# Patient Record
Sex: FEMALE | Race: WHITE | Employment: FULL TIME | URBAN - METROPOLITAN AREA
[De-identification: names, ages, dates, MRNs, and addresses within clinical notes are randomized per-mention and may not be internally consistent; named-entity substitution may affect disease eponyms.]

---

## 2017-10-21 ENCOUNTER — HOSPITAL ENCOUNTER (OUTPATIENT)
Age: 57
Discharge: HOME | End: 2017-10-21
Payer: COMMERCIAL

## 2017-10-21 VITALS
HEART RATE: 78 BPM | SYSTOLIC BLOOD PRESSURE: 120 MMHG | RESPIRATION RATE: 18 BRPM | WEIGHT: 173 LBS | TEMPERATURE: 97 F | OXYGEN SATURATION: 98 % | BODY MASS INDEX: 27.8 KG/M2 | DIASTOLIC BLOOD PRESSURE: 68 MMHG | HEIGHT: 66 IN

## 2017-10-21 DIAGNOSIS — S05.02XA ABRASION OF LEFT CORNEA, INITIAL ENCOUNTER: Primary | ICD-10-CM

## 2017-10-21 PROCEDURE — 99203 OFFICE O/P NEW LOW 30 MIN: CPT

## 2017-10-21 RX ORDER — TOBRAMYCIN AND DEXAMETHASONE 3; 1 MG/ML; MG/ML
2 SUSPENSION/ DROPS OPHTHALMIC
Qty: 1 BOTTLE | Refills: 0 | Status: SHIPPED | OUTPATIENT
Start: 2017-10-21 | End: 2017-10-28

## 2017-10-21 NOTE — ED INITIAL ASSESSMENT (HPI)
Left  Eye  Redness and irritation since last  Night. Pt  Reports fb sensation. Pt states last night eye was tearing, irritating, and painful. Pt removed contacts last night.

## 2017-10-21 NOTE — ED PROVIDER NOTES
Patient Seen in: 5 [facility]    History   Patient presents with:   Eye Visual Problem (opthalmic)    Stated Complaint: lt. eye redness    HPI     Patient is an otherwise healthy 15-year-old female who presents for evaluatio oz/week     Comment: moderatly       Review of Systems   Constitutional: Negative. HENT: Negative. Eyes: Positive for photophobia, discharge and redness. Negative for visual disturbance. Respiratory: Negative. Cardiovascular: Negative.         Po left cornea, initial encounter  (primary encounter diagnosis)    Disposition:  Discharge    Follow-up:  OPTHALMOLOGIST      1-2 DAYS       Medications Prescribed:  Current Discharge Medication List    START taking these medications    tobramycin-dexamethas

## 2018-03-12 ENCOUNTER — CHARTING TRANS (OUTPATIENT)
Dept: OTHER | Age: 58
End: 2018-03-12

## 2018-03-12 ENCOUNTER — LAB SERVICES (OUTPATIENT)
Dept: OTHER | Age: 58
End: 2018-03-12

## 2018-03-12 LAB — RAPID STREP GROUP A: NORMAL

## 2018-11-01 VITALS
WEIGHT: 178.57 LBS | BODY MASS INDEX: 28.7 KG/M2 | DIASTOLIC BLOOD PRESSURE: 70 MMHG | HEIGHT: 66 IN | TEMPERATURE: 99.4 F | SYSTOLIC BLOOD PRESSURE: 104 MMHG | HEART RATE: 82 BPM | OXYGEN SATURATION: 95 %

## 2021-08-17 ENCOUNTER — APPOINTMENT (OUTPATIENT)
Dept: CT IMAGING | Facility: HOSPITAL | Age: 61
End: 2021-08-17
Attending: EMERGENCY MEDICINE
Payer: COMMERCIAL

## 2021-08-17 ENCOUNTER — HOSPITAL ENCOUNTER (EMERGENCY)
Facility: HOSPITAL | Age: 61
Discharge: HOME | End: 2021-08-17
Attending: EMERGENCY MEDICINE
Payer: COMMERCIAL

## 2021-08-17 VITALS
DIASTOLIC BLOOD PRESSURE: 68 MMHG | HEART RATE: 75 BPM | OXYGEN SATURATION: 95 % | BODY MASS INDEX: 26.52 KG/M2 | TEMPERATURE: 99 F | WEIGHT: 165 LBS | SYSTOLIC BLOOD PRESSURE: 106 MMHG | HEIGHT: 66 IN | RESPIRATION RATE: 18 BRPM

## 2021-08-17 DIAGNOSIS — N12 PYELONEPHRITIS: Primary | ICD-10-CM

## 2021-08-17 LAB
ANION GAP SERPL CALC-SCNC: 6 MMOL/L (ref 0–18)
BASOPHILS # BLD AUTO: 0.02 X10(3) UL (ref 0–0.2)
BASOPHILS NFR BLD AUTO: 0.5 %
BILIRUB UR QL: NEGATIVE
BUN BLD-MCNC: 14 MG/DL (ref 7–18)
BUN/CREAT SERPL: 16.7 (ref 10–20)
CALCIUM BLD-MCNC: 8.3 MG/DL (ref 8.5–10.1)
CHLORIDE SERPL-SCNC: 106 MMOL/L (ref 98–112)
CO2 SERPL-SCNC: 25 MMOL/L (ref 21–32)
COLOR UR: YELLOW
CREAT BLD-MCNC: 0.84 MG/DL
DEPRECATED RDW RBC AUTO: 41.2 FL (ref 35.1–46.3)
EOSINOPHIL # BLD AUTO: 0.01 X10(3) UL (ref 0–0.7)
EOSINOPHIL NFR BLD AUTO: 0.2 %
ERYTHROCYTE [DISTWIDTH] IN BLOOD BY AUTOMATED COUNT: 12.3 % (ref 11–15)
GLUCOSE BLD-MCNC: 110 MG/DL (ref 70–99)
GLUCOSE UR-MCNC: NEGATIVE MG/DL
HCT VFR BLD AUTO: 38.7 %
HGB BLD-MCNC: 12.7 G/DL
HGB UR QL STRIP.AUTO: NEGATIVE
IMM GRANULOCYTES # BLD AUTO: 0.03 X10(3) UL (ref 0–1)
IMM GRANULOCYTES NFR BLD: 0.7 %
KETONES UR-MCNC: 20 MG/DL
LYMPHOCYTES # BLD AUTO: 0.71 X10(3) UL (ref 1–4)
LYMPHOCYTES NFR BLD AUTO: 17 %
MCH RBC QN AUTO: 29.7 PG (ref 26–34)
MCHC RBC AUTO-ENTMCNC: 32.8 G/DL (ref 31–37)
MCV RBC AUTO: 90.6 FL
MONOCYTES # BLD AUTO: 0.34 X10(3) UL (ref 0.1–1)
MONOCYTES NFR BLD AUTO: 8.1 %
NEUTROPHILS # BLD AUTO: 3.07 X10 (3) UL (ref 1.5–7.7)
NEUTROPHILS # BLD AUTO: 3.07 X10(3) UL (ref 1.5–7.7)
NEUTROPHILS NFR BLD AUTO: 73.5 %
NITRITE UR QL STRIP.AUTO: NEGATIVE
OSMOLALITY SERPL CALC.SUM OF ELEC: 285 MOSM/KG (ref 275–295)
PH UR: 5 [PH] (ref 5–8)
PLATELET # BLD AUTO: 150 10(3)UL (ref 150–450)
POTASSIUM SERPL-SCNC: 3.6 MMOL/L (ref 3.5–5.1)
PROT UR-MCNC: 30 MG/DL
RBC # BLD AUTO: 4.27 X10(6)UL
SODIUM SERPL-SCNC: 137 MMOL/L (ref 136–145)
SP GR UR STRIP: 1.02 (ref 1–1.03)
UROBILINOGEN UR STRIP-ACNC: <2
WBC # BLD AUTO: 4.2 X10(3) UL (ref 4–11)

## 2021-08-17 PROCEDURE — 81001 URINALYSIS AUTO W/SCOPE: CPT | Performed by: EMERGENCY MEDICINE

## 2021-08-17 PROCEDURE — 99284 EMERGENCY DEPT VISIT MOD MDM: CPT

## 2021-08-17 PROCEDURE — 96361 HYDRATE IV INFUSION ADD-ON: CPT

## 2021-08-17 PROCEDURE — 96365 THER/PROPH/DIAG IV INF INIT: CPT

## 2021-08-17 PROCEDURE — 80048 BASIC METABOLIC PNL TOTAL CA: CPT | Performed by: EMERGENCY MEDICINE

## 2021-08-17 PROCEDURE — 96366 THER/PROPH/DIAG IV INF ADDON: CPT

## 2021-08-17 PROCEDURE — 74176 CT ABD & PELVIS W/O CONTRAST: CPT | Performed by: EMERGENCY MEDICINE

## 2021-08-17 PROCEDURE — 85025 COMPLETE CBC W/AUTO DIFF WBC: CPT | Performed by: EMERGENCY MEDICINE

## 2021-08-17 PROCEDURE — 96375 TX/PRO/DX INJ NEW DRUG ADDON: CPT

## 2021-08-17 PROCEDURE — 87086 URINE CULTURE/COLONY COUNT: CPT | Performed by: EMERGENCY MEDICINE

## 2021-08-17 RX ORDER — TRAMADOL HYDROCHLORIDE 50 MG/1
50 TABLET ORAL EVERY 6 HOURS PRN
Qty: 10 TABLET | Refills: 0 | Status: SHIPPED | OUTPATIENT
Start: 2021-08-17 | End: 2021-08-24

## 2021-08-17 RX ORDER — MORPHINE SULFATE 4 MG/ML
2 INJECTION, SOLUTION INTRAMUSCULAR; INTRAVENOUS ONCE
Status: COMPLETED | OUTPATIENT
Start: 2021-08-17 | End: 2021-08-17

## 2021-08-17 RX ORDER — KETOROLAC TROMETHAMINE 15 MG/ML
15 INJECTION, SOLUTION INTRAMUSCULAR; INTRAVENOUS ONCE
Status: COMPLETED | OUTPATIENT
Start: 2021-08-17 | End: 2021-08-17

## 2021-08-17 RX ORDER — CEFADROXIL 500 MG/1
1 CAPSULE ORAL 2 TIMES DAILY
Qty: 40 CAPSULE | Refills: 0 | Status: SHIPPED | OUTPATIENT
Start: 2021-08-17 | End: 2021-08-27

## 2021-08-17 NOTE — ED INITIAL ASSESSMENT (HPI)
Patient complains of having left flank pain radiating to the groin. States that the pain started on Sunday. Patient reports a low grade temp of 100.8 two days ago. No N/V/D. Patient reports a history of kidney stones.  No s/s of UTI stated, no painful urina

## 2021-08-17 NOTE — ED PROVIDER NOTES
Patient Seen in: [facility] AND [facility] Emergency Department      History   Patient presents with:  Abdomen/Flank Pain    Stated Complaint: flank pain hx kidney stones    HPI/Subjective:   HPI    Patient presents the emergency department complaining of left f systems reviewed and negative except as noted above.     Physical Exam     ED Triage Vitals [08/17/21 0804]   /77   Pulse 96   Resp 18   Temp 98.8 °F (37.1 °C)   Temp src Oral   SpO2 95 %   O2 Device None (Room air)       Current:/68   Pulse 75 components:    Lymphocyte Absolute 0.71 (*)     All other components within normal limits   CBC WITH DIFFERENTIAL WITH PLATELET    Narrative: The following orders were created for panel order CBC With Differential With Platelet.   Procedure Prescribed:  Current Discharge Medication List    START taking these medications    Cefadroxil 500 MG Oral Cap  Take 2 capsules (1,000 mg total) by mouth 2 (two) times daily for 10 days.   Qty: 40 capsule Refills: 0    traMADol 50 MG Oral Tab  Take 1 tablet

## 2021-08-19 ENCOUNTER — HOSPITAL ENCOUNTER (EMERGENCY)
Facility: HOSPITAL | Age: 61
Discharge: HOME | End: 2021-08-19
Attending: EMERGENCY MEDICINE
Payer: COMMERCIAL

## 2021-08-19 VITALS
HEIGHT: 66 IN | OXYGEN SATURATION: 92 % | HEART RATE: 72 BPM | TEMPERATURE: 99 F | RESPIRATION RATE: 18 BRPM | BODY MASS INDEX: 26.52 KG/M2 | SYSTOLIC BLOOD PRESSURE: 96 MMHG | DIASTOLIC BLOOD PRESSURE: 67 MMHG | WEIGHT: 165 LBS

## 2021-08-19 DIAGNOSIS — U07.1 COVID-19 VIRUS INFECTION: Primary | ICD-10-CM

## 2021-08-19 LAB
ANION GAP SERPL CALC-SCNC: 6 MMOL/L (ref 0–18)
BASOPHILS # BLD AUTO: 0.01 X10(3) UL (ref 0–0.2)
BASOPHILS NFR BLD AUTO: 0.3 %
BUN BLD-MCNC: 13 MG/DL (ref 7–18)
BUN/CREAT SERPL: 18.6 (ref 10–20)
CALCIUM BLD-MCNC: 7.9 MG/DL (ref 8.5–10.1)
CHLORIDE SERPL-SCNC: 106 MMOL/L (ref 98–112)
CO2 SERPL-SCNC: 27 MMOL/L (ref 21–32)
CREAT BLD-MCNC: 0.7 MG/DL
DEPRECATED RDW RBC AUTO: 40.3 FL (ref 35.1–46.3)
EOSINOPHIL # BLD AUTO: 0 X10(3) UL (ref 0–0.7)
EOSINOPHIL NFR BLD AUTO: 0 %
ERYTHROCYTE [DISTWIDTH] IN BLOOD BY AUTOMATED COUNT: 12.4 % (ref 11–15)
GLUCOSE BLD-MCNC: 100 MG/DL (ref 70–99)
HCT VFR BLD AUTO: 36.9 %
HGB BLD-MCNC: 12.3 G/DL
IMM GRANULOCYTES # BLD AUTO: 0.01 X10(3) UL (ref 0–1)
IMM GRANULOCYTES NFR BLD: 0.3 %
LYMPHOCYTES # BLD AUTO: 0.46 X10(3) UL (ref 1–4)
LYMPHOCYTES NFR BLD AUTO: 13.9 %
MCH RBC QN AUTO: 29.6 PG (ref 26–34)
MCHC RBC AUTO-ENTMCNC: 33.3 G/DL (ref 31–37)
MCV RBC AUTO: 88.9 FL
MONOCYTES # BLD AUTO: 0.11 X10(3) UL (ref 0.1–1)
MONOCYTES NFR BLD AUTO: 3.3 %
NEUTROPHILS # BLD AUTO: 2.73 X10 (3) UL (ref 1.5–7.7)
NEUTROPHILS # BLD AUTO: 2.73 X10(3) UL (ref 1.5–7.7)
NEUTROPHILS NFR BLD AUTO: 82.2 %
OSMOLALITY SERPL CALC.SUM OF ELEC: 288 MOSM/KG (ref 275–295)
PLATELET # BLD AUTO: 109 10(3)UL (ref 150–450)
POTASSIUM SERPL-SCNC: 3.6 MMOL/L (ref 3.5–5.1)
RBC # BLD AUTO: 4.15 X10(6)UL
SODIUM SERPL-SCNC: 139 MMOL/L (ref 136–145)
WBC # BLD AUTO: 3.3 X10(3) UL (ref 4–11)

## 2021-08-19 PROCEDURE — 96374 THER/PROPH/DIAG INJ IV PUSH: CPT

## 2021-08-19 PROCEDURE — 99453 REM MNTR PHYSIOL PARAM SETUP: CPT

## 2021-08-19 PROCEDURE — 96375 TX/PRO/DX INJ NEW DRUG ADDON: CPT

## 2021-08-19 PROCEDURE — 80048 BASIC METABOLIC PNL TOTAL CA: CPT | Performed by: EMERGENCY MEDICINE

## 2021-08-19 PROCEDURE — 99284 EMERGENCY DEPT VISIT MOD MDM: CPT

## 2021-08-19 PROCEDURE — 96361 HYDRATE IV INFUSION ADD-ON: CPT

## 2021-08-19 PROCEDURE — 85025 COMPLETE CBC W/AUTO DIFF WBC: CPT | Performed by: EMERGENCY MEDICINE

## 2021-08-19 RX ORDER — METOCLOPRAMIDE HYDROCHLORIDE 5 MG/ML
10 INJECTION INTRAMUSCULAR; INTRAVENOUS ONCE
Status: COMPLETED | OUTPATIENT
Start: 2021-08-19 | End: 2021-08-19

## 2021-08-19 RX ORDER — DIPHENHYDRAMINE HYDROCHLORIDE 50 MG/ML
25 INJECTION INTRAMUSCULAR; INTRAVENOUS ONCE
Status: COMPLETED | OUTPATIENT
Start: 2021-08-19 | End: 2021-08-19

## 2021-08-19 RX ORDER — KETOROLAC TROMETHAMINE 30 MG/ML
30 INJECTION, SOLUTION INTRAMUSCULAR; INTRAVENOUS ONCE
Status: COMPLETED | OUTPATIENT
Start: 2021-08-19 | End: 2021-08-19

## 2021-08-19 NOTE — ED PROVIDER NOTES
Patient Seen in: [facility] AND [facility] Emergency Department      History   Patient presents with:  Headache    Stated Complaint: covid+; here for ear/head pain    HPI/Subjective:   HPI    79-year-old female without significant past medical history presents w stated complaint: covid+; here for ear/head pain  Other systems are as noted in HPI. Constitutional and vital signs reviewed. All other systems reviewed and negative except as noted above.     Physical Exam     ED Triage Vitals [08/19/21 0925]   BP 10 ------                     CBC W/ DIFFERENTIAL[[id number]]          Abnormal            Final result                 Please view results for these tests on the individual orders.                  MDM      Patient reports resolution of headache post medicatio instructions.            Medications Prescribed:  Current Discharge Medication List

## 2021-08-19 NOTE — ED QUICK NOTES
Patient educated on pulse oximeter and incentive spirometer training. Return demonstration provided by patient. Patient given written instructions on use of pulse oximeter and incentive spirometer.

## 2021-08-19 NOTE — ED QUICK NOTES
Infusion complete, line flushed with NS per protocol. Patient tolerated well. She remains on monitor. She denies any needs at this time.

## 2021-08-19 NOTE — ED INITIAL ASSESSMENT (HPI)
Patient c/o shooting headache that is getting worst started yesterday, pt is covid positive as of yesterday, and on oral antibiotic due to pyelonephritis

## 2021-08-21 PROBLEM — U07.1 COVID-19 VIRUS INFECTION: Status: ACTIVE | Noted: 2021-08-21

## 2021-09-02 PROBLEM — S05.00XA CORNEAL ABRASION: Status: ACTIVE | Noted: 2021-05-29

## 2022-12-29 ENCOUNTER — HOSPITAL ENCOUNTER (OUTPATIENT)
Dept: GENERAL RADIOLOGY | Facility: HOSPITAL | Age: 62
Discharge: HOME | End: 2022-12-29
Payer: COMMERCIAL

## 2022-12-29 DIAGNOSIS — M54.42 ACUTE LEFT-SIDED BACK PAIN WITH SCIATICA: ICD-10-CM

## 2022-12-29 PROCEDURE — 72110 X-RAY EXAM L-2 SPINE 4/>VWS: CPT

## 2023-01-11 ENCOUNTER — TELEPHONE (OUTPATIENT)
Dept: PHYSICAL MEDICINE AND REHAB | Facility: CLINIC | Age: 63
End: 2023-01-11

## 2023-01-11 ENCOUNTER — OFFICE VISIT (OUTPATIENT)
Dept: PHYSICAL MEDICINE AND REHAB | Facility: CLINIC | Age: 63
End: 2023-01-11
Payer: COMMERCIAL

## 2023-01-11 VITALS
HEIGHT: 66 IN | WEIGHT: 174 LBS | BODY MASS INDEX: 27.97 KG/M2 | DIASTOLIC BLOOD PRESSURE: 76 MMHG | SYSTOLIC BLOOD PRESSURE: 104 MMHG | HEART RATE: 100 BPM | OXYGEN SATURATION: 100 %

## 2023-01-11 DIAGNOSIS — G89.29 CHRONIC LEFT-SIDED LOW BACK PAIN WITH LEFT-SIDED SCIATICA: ICD-10-CM

## 2023-01-11 DIAGNOSIS — M54.16 LUMBAR RADICULOPATHY: ICD-10-CM

## 2023-01-11 DIAGNOSIS — M51.9 LUMBAR DISC DISEASE: ICD-10-CM

## 2023-01-11 DIAGNOSIS — M43.16 SPONDYLOLISTHESIS OF LUMBAR REGION: ICD-10-CM

## 2023-01-11 DIAGNOSIS — M54.42 CHRONIC LEFT-SIDED LOW BACK PAIN WITH LEFT-SIDED SCIATICA: ICD-10-CM

## 2023-01-11 DIAGNOSIS — M48.061 LUMBAR FORAMINAL STENOSIS: Primary | ICD-10-CM

## 2023-01-11 PROCEDURE — 3008F BODY MASS INDEX DOCD: CPT | Performed by: PHYSICAL MEDICINE & REHABILITATION

## 2023-01-11 PROCEDURE — 3074F SYST BP LT 130 MM HG: CPT | Performed by: PHYSICAL MEDICINE & REHABILITATION

## 2023-01-11 PROCEDURE — 99244 OFF/OP CNSLTJ NEW/EST MOD 40: CPT | Performed by: PHYSICAL MEDICINE & REHABILITATION

## 2023-01-11 PROCEDURE — 3078F DIAST BP <80 MM HG: CPT | Performed by: PHYSICAL MEDICINE & REHABILITATION

## 2023-01-11 NOTE — PATIENT INSTRUCTIONS
Plan  I will do bilateral L5 TFESI's. She will get into the PT once she has had the above. The patient will follow up in 2-3 months, but the patient will call me 2 weeks after having the injection to let me know how the injection worked.

## 2023-01-11 NOTE — TELEPHONE ENCOUNTER
Initiated authorization for Bilateral L5 TFESIs CPT [id number] dx:M54.16 to be done at [facility] with Mulugeta Linder at [facility]  Case #[id number]  Status: Approved-authorization is not required per health plan

## 2023-01-12 NOTE — TELEPHONE ENCOUNTER
Patient has been scheduled for Bilateral L5 TFESIs on 1/20/23 at the [facility] with Raisa Whitfield. -Anesthesia type: local.  -If scheduling [address] covid testing required for all procedures whether patient is vaccinated or not. n/a  -Patient informed not to eat or drink anything after midnight the night prior to the procedure, if being sedated. (Patient informed to fast 12 hours prior to procedure with IVCS/MAC. ) n/a  -Patient was advised that if she does receive the covid vaccine it needs to be at least 2 weeks before or after the injection. -Medications and allergies reviewed. -Patient reminded to hold NSAIDs (Ibuprofen, ASA 81, Aleve, Naproxen, Mobic, Diclofenac, Etodolac, Celebrex etc.) for 3 days prior to [facility]  if BMI is greater than 35. For Cervical injections only hold multivitamins, Vitamin E, Fish Oil, Phentermine (Lomaira) for 7 days prior to injection and NSAIDS.  mg to be held for 7 days prior to injections.  -If patient is receiving MAC/IVCS Phentermine Levell Marus) will need to be held for 7 days prior to injection. n/a  -Patient informed she will need a  to and from procedure. -[facility] is located in the [facility] 1st floor. Patient may park in the yellow parking. Patient verbalized understanding and agrees with plan.  -----> Scheduled in Epic: Yes  -----> Scheduled in Casetabs:  Yes

## 2023-01-12 NOTE — TELEPHONE ENCOUNTER
LMTCB. Patient to be scheduled for Bilateral L5 TFESIs at the [facility] with .    -Anesthesia type: local.

## 2023-01-20 ENCOUNTER — TELEPHONE (OUTPATIENT)
Dept: PHYSICAL THERAPY | Facility: HOSPITAL | Age: 63
End: 2023-01-20

## 2023-01-20 ENCOUNTER — OFFICE VISIT (OUTPATIENT)
Dept: SURGERY | Facility: CLINIC | Age: 63
End: 2023-01-20
Payer: COMMERCIAL

## 2023-01-20 DIAGNOSIS — M51.9 LUMBAR DISC DISEASE: ICD-10-CM

## 2023-01-20 DIAGNOSIS — M54.16 LUMBAR RADICULOPATHY: Primary | ICD-10-CM

## 2023-01-20 PROCEDURE — 64483 NJX AA&/STRD TFRM EPI L/S 1: CPT | Performed by: PHYSICAL MEDICINE & REHABILITATION

## 2023-01-20 NOTE — PROCEDURES
[facility] [address] [facility]    BILATERAL LUMBAR TRANSFORAMINAL   NAME:  Brandie Bradley    MR #:    [id number] :  1960     PHYSICIAN:  Kati Ordaz MD        Operative Report    DATE OF PROCEDURE: 2023   PREOPERATIVE DIAGNOSES: 1. left > right L5 radiculopathy    2. L5-S1 right mild-mod/left mod far lateral & mild central, L4-5 left mild far lateral bulging discs        POSTOPERATIVE DIAGNOSES:   1. left > right L5 radiculopathy    2. L5-S1 right mild-mod/left mod far lateral & mild central, L4-5 left mild far lateral bulging discs        PROCEDURES: Bilateral L5 transforaminal epidural steroid injections done under fluoroscopic guidance with contrast enhancement. SURGEON: Kati Cagle MD   ANESTHESIA: Local   INDICATIONS:      OPERATIVE PROCEDURE:  Written consent was obtained from the patient. The patient was brought into the operating room and placed in the prone position on the fluoroscopy table with pillow underneath her abdomen. The patient's skin was cleaned and draped in a normal sterile fashion. Using AP fluoroscopy, all five lumbar vertebrae were identified. When the fifth vertebra was identified, fluoroscopy was left anterior obliqued opening up the right L5-S1 intervertebral foramen. At this point in time, the patient's skin was anesthetized with 1% PF lidocaine without epinephrine. Then, a 5 inch, 22-gauge spinal needle was inserted and directed towards the right L5-S1 intervertebral foramen. When it felt to be in good position, AP fluoroscopy was used to advance the needle to the 6 o'clock position on the right L5 pedicle. At this point in time, Omnipaque-240 contrast was used to obtain a good epidurogram indicating correct needle placement. Then, aspiration was performed. No blood, fluid, or air was aspirated. Then, the patient was injected with a 3 cc solution of 1.5 cc of 40 mg/cc of Kenalog and 1.5 cc of 1% PF lidocaine without epinephrine.   After this, the needle was removed. Then  fluoroscopy was right anterior obliqued opening up the left L5-S1 intervertebral foramen. At this point in time, the patient's skin was anesthetized with 1 to 2 cc of 1% PF lidocaine without epinephrine. Then, a 5 inch, 22-gauge spinal needle was inserted and directed towards the left L5-S1 intervertebral foramen. When it felt to be in good position, AP fluoroscopy was used to advance the needle to the 6 o'clock position on the left L5 pedicle. At this point in time, Omnipaque-240 contrast was used to obtain a good epidurogram indicating correct needle placement. Then, aspiration was performed. No blood, fluid, or air was aspirated. Then, the patient was injected with a 3 cc solution of 1.5 cc of 40 mg/cc of Kenalog and 1.5 cc of 1% PF lidocaine without epinephrine. After this, the needle was removed. The patient's skin was cleaned. Band-Aids were applied. The patient was transferred to the cart and into [facility]. The patient was given discharge instructions and will follow up in the clinic as scheduled. Throughout the whole procedure, the patient's pulse oximetry and vital signs were monitored and they remained completely stable. Also, throughout the whole procedure, prior to injection of any medication, aspiration was performed. No blood, fluid, or air was aspirated at anytime.

## 2023-01-23 ENCOUNTER — TELEPHONE (OUTPATIENT)
Dept: PHYSICAL THERAPY | Facility: HOSPITAL | Age: 63
End: 2023-01-23

## 2023-01-26 ENCOUNTER — TELEPHONE (OUTPATIENT)
Dept: PHYSICAL THERAPY | Facility: HOSPITAL | Age: 63
End: 2023-01-26

## 2023-01-27 ENCOUNTER — TELEPHONE (OUTPATIENT)
Dept: PHYSICAL MEDICINE AND REHAB | Facility: CLINIC | Age: 63
End: 2023-01-27

## 2023-01-27 NOTE — TELEPHONE ENCOUNTER
Pt called to update post inj- okay- pt had some pain after inj- more symptoms- feeling like lower pressure in the  Back- feels like sciatica pain- please call to advise.

## 2023-01-30 ENCOUNTER — OFFICE VISIT (OUTPATIENT)
Dept: PHYSICAL THERAPY | Age: 63
End: 2023-01-30
Attending: PHYSICAL MEDICINE & REHABILITATION
Payer: COMMERCIAL

## 2023-01-30 DIAGNOSIS — G89.29 CHRONIC LEFT-SIDED LOW BACK PAIN WITH LEFT-SIDED SCIATICA: ICD-10-CM

## 2023-01-30 DIAGNOSIS — M51.9 LUMBAR DISC DISEASE: ICD-10-CM

## 2023-01-30 DIAGNOSIS — M43.16 SPONDYLOLISTHESIS OF LUMBAR REGION: ICD-10-CM

## 2023-01-30 DIAGNOSIS — M48.061 LUMBAR FORAMINAL STENOSIS: ICD-10-CM

## 2023-01-30 DIAGNOSIS — M54.42 CHRONIC LEFT-SIDED LOW BACK PAIN WITH LEFT-SIDED SCIATICA: ICD-10-CM

## 2023-01-30 DIAGNOSIS — M54.16 LUMBAR RADICULOPATHY: ICD-10-CM

## 2023-01-30 PROCEDURE — 97112 NEUROMUSCULAR REEDUCATION: CPT

## 2023-01-30 PROCEDURE — 97140 MANUAL THERAPY 1/> REGIONS: CPT

## 2023-01-30 PROCEDURE — 97162 PT EVAL MOD COMPLEX 30 MIN: CPT

## 2023-01-30 NOTE — TELEPHONE ENCOUNTER
ALEC. Call went straight to . Need to get a condition update. LOV: 1/11/23  NOV: 3/13/23  Last injection: 1/20/23- Bilateral L5 transforaminal epidural steroid injections    Plan per  at [address] [place] [address]: \"I will do bilateral L5 TFESI's. She will get into the PT once she has had the above. \"    Patient is scheduled to start PT today 1/30/23.

## 2023-01-31 NOTE — TELEPHONE ENCOUNTER
Condition update after Procedure. - Patient had ERAL LUMBAR TRANSFORAMINAL  (specific procedure) on 1/20/2023 (date) with . - 75% relief. If pain is worse: N/A  - Pain level prior to procedure:   5-10 range and constant w/ radiation to L side  - Current pain level:  4 (intermittent w/ radiation sometimes to R side    - Pain location: lower back. - Pain description: aching  - Current pain treatment: PT and massage  - Current prescription pain medications/dosing: N/A    - LOV: 1/11/2023 Mckenzie Cagle MD    - NOV: 3/13/2023 Jacqueline Yoon MD   - Plan from [address] [place] [place] [address]: \"The patient will follow up in 2-3 months\"      Waking up at 4;30 am and feeling awake and better. Has dull pain but is more manageable. Still feels pressure in lower back. Still gets sciatica going down L leg from buttock to knee. New Symptom: inner L thigh has shooting pain going upwards intermittently. Had 1st PT yesterday with light massage, and PT stated it was tight. Still feels sore this morning. Patient had a fall on driveway on 7/85/2159 and landed on R side (but pain started before the fall). Patient is concerned that the gentle massage she received should cause that much pain that lasted until next day. RN was able to answer patient's questions as Dr. Barbi Soto was available in person to ask about the massage therapy. See Note below. Patient verbalized understaning and discussed follow up appt in 2-3 months. Patient stated would make on MyChart. Note:  Spoke w/ Dr. Barbi Soto in person while in office and gave him update. Dr. Barbi Soto stated that it is not at all uncommon for the first few PT sessions to lead to some soreness and discomfort, and he would like her to continue w/ her PT at this time.

## 2023-02-02 ENCOUNTER — OFFICE VISIT (OUTPATIENT)
Dept: PHYSICAL THERAPY | Age: 63
End: 2023-02-02
Attending: INTERNAL MEDICINE
Payer: COMMERCIAL

## 2023-02-02 PROCEDURE — 97530 THERAPEUTIC ACTIVITIES: CPT

## 2023-02-02 PROCEDURE — 97112 NEUROMUSCULAR REEDUCATION: CPT

## 2023-02-02 PROCEDURE — 97140 MANUAL THERAPY 1/> REGIONS: CPT

## 2023-02-07 ENCOUNTER — OFFICE VISIT (OUTPATIENT)
Dept: PHYSICAL THERAPY | Age: 63
End: 2023-02-07
Attending: PHYSICAL MEDICINE & REHABILITATION
Payer: COMMERCIAL

## 2023-02-07 PROCEDURE — 97112 NEUROMUSCULAR REEDUCATION: CPT

## 2023-02-07 PROCEDURE — 97140 MANUAL THERAPY 1/> REGIONS: CPT

## 2023-02-07 PROCEDURE — 97530 THERAPEUTIC ACTIVITIES: CPT

## 2023-02-09 ENCOUNTER — OFFICE VISIT (OUTPATIENT)
Dept: PHYSICAL THERAPY | Age: 63
End: 2023-02-09
Attending: PHYSICAL MEDICINE & REHABILITATION
Payer: COMMERCIAL

## 2023-02-09 PROCEDURE — 97530 THERAPEUTIC ACTIVITIES: CPT

## 2023-02-09 PROCEDURE — 97140 MANUAL THERAPY 1/> REGIONS: CPT

## 2023-02-09 PROCEDURE — 97112 NEUROMUSCULAR REEDUCATION: CPT

## 2023-02-14 ENCOUNTER — APPOINTMENT (OUTPATIENT)
Dept: PHYSICAL THERAPY | Age: 63
End: 2023-02-14
Attending: PHYSICAL MEDICINE & REHABILITATION
Payer: COMMERCIAL

## 2023-02-21 ENCOUNTER — OFFICE VISIT (OUTPATIENT)
Dept: PHYSICAL THERAPY | Age: 63
End: 2023-02-21
Attending: PHYSICAL MEDICINE & REHABILITATION
Payer: COMMERCIAL

## 2023-02-21 PROCEDURE — 97530 THERAPEUTIC ACTIVITIES: CPT

## 2023-02-21 PROCEDURE — 97112 NEUROMUSCULAR REEDUCATION: CPT

## 2023-02-21 PROCEDURE — 97110 THERAPEUTIC EXERCISES: CPT

## 2023-02-21 PROCEDURE — 97140 MANUAL THERAPY 1/> REGIONS: CPT

## 2023-02-23 ENCOUNTER — OFFICE VISIT (OUTPATIENT)
Dept: PHYSICAL THERAPY | Age: 63
End: 2023-02-23
Attending: PHYSICAL MEDICINE & REHABILITATION
Payer: COMMERCIAL

## 2023-02-23 PROCEDURE — 97140 MANUAL THERAPY 1/> REGIONS: CPT

## 2023-02-23 PROCEDURE — 97112 NEUROMUSCULAR REEDUCATION: CPT

## 2023-02-23 PROCEDURE — 97530 THERAPEUTIC ACTIVITIES: CPT

## 2023-02-23 PROCEDURE — 97110 THERAPEUTIC EXERCISES: CPT

## 2023-02-24 ENCOUNTER — TELEPHONE (OUTPATIENT)
Dept: PHYSICAL THERAPY | Facility: HOSPITAL | Age: 63
End: 2023-02-24

## 2023-02-28 ENCOUNTER — OFFICE VISIT (OUTPATIENT)
Dept: PHYSICAL THERAPY | Age: 63
End: 2023-02-28
Attending: PHYSICAL MEDICINE & REHABILITATION
Payer: COMMERCIAL

## 2023-02-28 PROCEDURE — 97112 NEUROMUSCULAR REEDUCATION: CPT

## 2023-02-28 PROCEDURE — 97530 THERAPEUTIC ACTIVITIES: CPT

## 2023-02-28 PROCEDURE — 97140 MANUAL THERAPY 1/> REGIONS: CPT

## 2023-03-02 ENCOUNTER — OFFICE VISIT (OUTPATIENT)
Dept: PHYSICAL THERAPY | Age: 63
End: 2023-03-02
Attending: PHYSICAL MEDICINE & REHABILITATION
Payer: COMMERCIAL

## 2023-03-02 PROCEDURE — 97530 THERAPEUTIC ACTIVITIES: CPT

## 2023-03-02 PROCEDURE — 97140 MANUAL THERAPY 1/> REGIONS: CPT

## 2023-03-13 ENCOUNTER — OFFICE VISIT (OUTPATIENT)
Dept: PHYSICAL MEDICINE AND REHAB | Facility: CLINIC | Age: 63
End: 2023-03-13
Payer: COMMERCIAL

## 2023-03-13 VITALS — WEIGHT: 174 LBS | OXYGEN SATURATION: 100 % | HEIGHT: 66 IN | HEART RATE: 91 BPM | BODY MASS INDEX: 27.97 KG/M2

## 2023-03-13 DIAGNOSIS — M51.9 LUMBAR DISC DISEASE: ICD-10-CM

## 2023-03-13 DIAGNOSIS — M43.16 SPONDYLOLISTHESIS OF LUMBAR REGION: ICD-10-CM

## 2023-03-13 DIAGNOSIS — M48.061 LUMBAR FORAMINAL STENOSIS: Primary | ICD-10-CM

## 2023-03-13 DIAGNOSIS — M54.16 LUMBAR RADICULOPATHY: ICD-10-CM

## 2023-03-13 PROCEDURE — 99214 OFFICE O/P EST MOD 30 MIN: CPT | Performed by: PHYSICAL MEDICINE & REHABILITATION

## 2023-03-13 PROCEDURE — 3008F BODY MASS INDEX DOCD: CPT | Performed by: PHYSICAL MEDICINE & REHABILITATION

## 2023-03-13 RX ORDER — AMOXICILLIN 500 MG/1
CAPSULE ORAL
COMMUNITY
Start: 2023-03-11

## 2023-03-13 NOTE — PATIENT INSTRUCTIONS
Plan  She will continue with the PT for 2 more weeks and then she will let me know how she is doing. If the pain is not getting better or if it is getting worse, then I will do repeat bilateral L5 TFESI's. The patient does not need any pain medications at this time. She will follow up in 3 months or sooner if needed.

## 2023-03-14 ENCOUNTER — OFFICE VISIT (OUTPATIENT)
Dept: PHYSICAL THERAPY | Age: 63
End: 2023-03-14
Attending: PHYSICAL MEDICINE & REHABILITATION
Payer: COMMERCIAL

## 2023-03-14 PROCEDURE — 97112 NEUROMUSCULAR REEDUCATION: CPT

## 2023-03-14 PROCEDURE — 97530 THERAPEUTIC ACTIVITIES: CPT

## 2023-03-14 PROCEDURE — 97140 MANUAL THERAPY 1/> REGIONS: CPT

## 2023-03-16 ENCOUNTER — OFFICE VISIT (OUTPATIENT)
Dept: PHYSICAL THERAPY | Age: 63
End: 2023-03-16
Attending: INTERNAL MEDICINE
Payer: COMMERCIAL

## 2023-03-16 PROCEDURE — 97140 MANUAL THERAPY 1/> REGIONS: CPT

## 2023-03-16 PROCEDURE — 97112 NEUROMUSCULAR REEDUCATION: CPT

## 2023-03-16 PROCEDURE — 97530 THERAPEUTIC ACTIVITIES: CPT

## 2023-03-21 ENCOUNTER — APPOINTMENT (OUTPATIENT)
Dept: PHYSICAL THERAPY | Age: 63
End: 2023-03-21
Attending: INTERNAL MEDICINE
Payer: COMMERCIAL

## 2023-03-21 ENCOUNTER — TELEPHONE (OUTPATIENT)
Dept: PHYSICAL THERAPY | Facility: HOSPITAL | Age: 63
End: 2023-03-21

## 2023-03-23 ENCOUNTER — OFFICE VISIT (OUTPATIENT)
Dept: PHYSICAL THERAPY | Age: 63
End: 2023-03-23
Attending: INTERNAL MEDICINE
Payer: COMMERCIAL

## 2023-03-23 PROCEDURE — 97112 NEUROMUSCULAR REEDUCATION: CPT

## 2023-03-23 PROCEDURE — 97140 MANUAL THERAPY 1/> REGIONS: CPT

## 2023-03-23 PROCEDURE — 97530 THERAPEUTIC ACTIVITIES: CPT

## 2023-05-15 ENCOUNTER — TELEPHONE (OUTPATIENT)
Dept: PHYSICAL MEDICINE AND REHAB | Facility: CLINIC | Age: 63
End: 2023-05-15

## 2023-05-15 NOTE — TELEPHONE ENCOUNTER
Initiated authorization for Bilateral L5 TFESIs CPT [id number] dx:M54.16 to be done at [address] with [employer]  Request Tracking ID #[id number]  Status: Approved-authorization is not required per health plan

## 2023-05-16 NOTE — TELEPHONE ENCOUNTER
Patient has been scheduled for Bilateral L5 TFESIs  on 5/19/2023 at the [facility] with Alton Rossing. -Anesthesia type: LOCAL. -If scheduling [address] covid testing required for all procedures whether patient is vaccinated or not. -Patient informed not to eat or drink anything after midnight the night prior to the procedure, if being sedated. (Patient informed to fast 12 hours prior to procedure with IVCS/MAC. )  -Patient was advised that if he/she does receive the covid vaccine it needs to be at least 2 weeks before or after the injection. -Medications and allergies reviewed. -Patient reminded to hold NSAIDs (Ibuprofen, ASA 81, Aleve, Naproxen, Mobic, Diclofenac, Etodolac, Celebrex etc.) for 3 days prior to [place]  if BMI is greater than 35. For Cervical injections only hold multivitamins, Vitamin E, Fish Oil, Phentermine (Lomaira) for 7 days prior to injection and NSAIDS.  mg to be held for 7 days prior to injections.  -If patient is receiving MAC/IVCS Phentermine Vermell Manzanilla) will need to be held for 7 days prior to injection.  -If on blood thinner clearance has been received to hold this medication by provider.   -Patient informed he/she will need a  to and from procedure. -[facility] is located in the [facility] 1st floor. Patient may park in the yellow or purple parking. Patient verbalized understanding and agrees with plan.  -----> Scheduled in Epic: Yes  -----> Scheduled in Casetabs:  Yes

## 2023-05-19 ENCOUNTER — OFFICE VISIT (OUTPATIENT)
Dept: SURGERY | Facility: CLINIC | Age: 63
End: 2023-05-19
Payer: COMMERCIAL

## 2023-05-19 DIAGNOSIS — M54.16 LUMBAR RADICULOPATHY: Primary | ICD-10-CM

## 2023-05-19 DIAGNOSIS — M51.9 LUMBAR DISC DISEASE: ICD-10-CM

## 2023-05-19 NOTE — PROCEDURES
[facility] [address]    BILATERAL LUMBAR TRANSFORAMINAL   NAME:  Kai Chadwick    MR #:    [id number] :  1960     PHYSICIAN:  Timmy Voss MD        Operative Report    DATE OF PROCEDURE: 2023   PREOPERATIVE DIAGNOSES: 1. left > right L5 radiculopathy    2. L5-S1 right mild-mod/left mod far lateral & mild central, L4-5 left mild far lateral bulging discs        POSTOPERATIVE DIAGNOSES:   1. left > right L5 radiculopathy    2. L5-S1 right mild-mod/left mod far lateral & mild central, L4-5 left mild far lateral bulging discs        PROCEDURES: Bilateral L5 transforaminal epidural steroid injections done under fluoroscopic guidance with contrast enhancement. SURGEON: Timmy Cagle MD   ANESTHESIA: Local   INDICATIONS:      OPERATIVE PROCEDURE:  Written consent was obtained from the patient. The patient was brought into the operating room and placed in the prone position on the fluoroscopy table with pillow underneath her abdomen. The patient's skin was cleaned and draped in a normal sterile fashion. Using AP fluoroscopy, all five lumbar vertebrae were identified. When the fifth vertebra was identified, fluoroscopy was left anterior obliqued opening up the right L5-S1 intervertebral foramen. At this point in time, the patient's skin was anesthetized with 1% PF lidocaine without epinephrine. Then, a 5 inch, 22-gauge spinal needle was inserted and directed towards the right L5-S1 intervertebral foramen. When it felt to be in good position, AP fluoroscopy was used to advance the needle to the 6 o'clock position on the right L5 pedicle. At this point in time, Omnipaque-240 contrast was used to obtain a good epidurogram indicating correct needle placement. Then, aspiration was performed. No blood, fluid, or air was aspirated. Then, the patient was injected with a 3 cc solution of 1.5 cc of 40 mg/cc of Kenalog and 1.5 cc of 1% PF lidocaine without epinephrine.   After this, the needle was removed. Then  fluoroscopy was right anterior obliqued opening up the left L5-S1 intervertebral foramen. At this point in time, the patient's skin was anesthetized with 1 to 2 cc of 1% PF lidocaine without epinephrine. Then, a 5 inch, 22-gauge spinal needle was inserted and directed towards the left L5-S1 intervertebral foramen. When it felt to be in good position, AP fluoroscopy was used to advance the needle to the 6 o'clock position on the left L5 pedicle. At this point in time, Omnipaque-240 contrast was used to obtain a good epidurogram indicating correct needle placement. Then, aspiration was performed. No blood, fluid, or air was aspirated. Then, the patient was injected with a 3 cc solution of 1.5 cc of 40 mg/cc of Kenalog and 1.5 cc of 1% PF lidocaine without epinephrine. After this, the needle was removed. The patient's skin was cleaned. Band-Aids were applied. The patient was transferred to the cart and into [facility]. The patient was given discharge instructions and will follow up in the clinic as scheduled. Throughout the whole procedure, the patient's pulse oximetry and vital signs were monitored and they remained completely stable. Also, throughout the whole procedure, prior to injection of any medication, aspiration was performed. No blood, fluid, or air was aspirated at anytime.

## 2023-05-31 ENCOUNTER — OFFICE VISIT (OUTPATIENT)
Dept: PHYSICAL MEDICINE AND REHAB | Facility: CLINIC | Age: 63
End: 2023-05-31
Payer: COMMERCIAL

## 2023-05-31 VITALS
HEART RATE: 74 BPM | SYSTOLIC BLOOD PRESSURE: 116 MMHG | OXYGEN SATURATION: 99 % | HEIGHT: 66 IN | WEIGHT: 177 LBS | BODY MASS INDEX: 28.45 KG/M2 | DIASTOLIC BLOOD PRESSURE: 80 MMHG

## 2023-05-31 DIAGNOSIS — M48.061 LUMBAR FORAMINAL STENOSIS: Primary | ICD-10-CM

## 2023-05-31 DIAGNOSIS — M43.16 SPONDYLOLISTHESIS OF LUMBAR REGION: ICD-10-CM

## 2023-05-31 DIAGNOSIS — M51.9 LUMBAR DISC DISEASE: ICD-10-CM

## 2023-05-31 DIAGNOSIS — M54.16 LUMBAR RADICULOPATHY: ICD-10-CM

## 2023-05-31 DIAGNOSIS — M54.42 CHRONIC LEFT-SIDED LOW BACK PAIN WITH LEFT-SIDED SCIATICA: ICD-10-CM

## 2023-05-31 DIAGNOSIS — G89.29 CHRONIC LEFT-SIDED LOW BACK PAIN WITH LEFT-SIDED SCIATICA: ICD-10-CM

## 2023-05-31 PROCEDURE — 3008F BODY MASS INDEX DOCD: CPT | Performed by: PHYSICAL MEDICINE & REHABILITATION

## 2023-05-31 PROCEDURE — 99213 OFFICE O/P EST LOW 20 MIN: CPT | Performed by: PHYSICAL MEDICINE & REHABILITATION

## 2023-05-31 PROCEDURE — 3074F SYST BP LT 130 MM HG: CPT | Performed by: PHYSICAL MEDICINE & REHABILITATION

## 2023-05-31 PROCEDURE — 3079F DIAST BP 80-89 MM HG: CPT | Performed by: PHYSICAL MEDICINE & REHABILITATION

## 2023-05-31 RX ORDER — PROGESTERONE 100 MG/1
100 CAPSULE ORAL DAILY
COMMUNITY
Start: 2023-03-14

## 2023-05-31 NOTE — PATIENT INSTRUCTIONS
Plan  The patient will continue with her home exercise program.    The patient does not need any injections at this time. The patient does not need any pain medications at this time. She will follow up in 3-4 months or sooner if needed.

## 2023-07-20 ENCOUNTER — TELEPHONE (OUTPATIENT)
Dept: PHYSICAL MEDICINE AND REHAB | Facility: CLINIC | Age: 63
End: 2023-07-20

## 2023-07-21 NOTE — TELEPHONE ENCOUNTER
SW patient and she is going to fax the form for chair on Monday , stated that Dr. 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 Her work stated they would get one for her, but she just needs to get a medical recommendation letter/order/form filled out. This RN advised that patient can send in fax, and once it is received, we can present to Dr. Дмитрий Cooper. Per PSR note in reason for call (cut and paste into this note): \"Pt  phoned to inquire as to whether MD can fill out Medical Recommendation forms from her place of employment Re; a \"kneeling\" chair that she would like purchased for her through her company. Pt is going to fax forms and is asking that someone from MD's office reach back out to her to inform her as to whether MD will do it.  Corrina [address] # [phone]\"

## 2023-08-07 ENCOUNTER — TELEPHONE (OUTPATIENT)
Dept: PHYSICAL MEDICINE AND REHAB | Facility: CLINIC | Age: 63
End: 2023-08-07

## 2023-08-10 ENCOUNTER — OFFICE VISIT (OUTPATIENT)
Dept: PHYSICAL MEDICINE AND REHAB | Facility: CLINIC | Age: 63
End: 2023-08-10
Payer: COMMERCIAL

## 2023-08-10 ENCOUNTER — MED REC SCAN ONLY (OUTPATIENT)
Dept: PHYSICAL MEDICINE AND REHAB | Facility: CLINIC | Age: 63
End: 2023-08-10

## 2023-08-10 VITALS — DIASTOLIC BLOOD PRESSURE: 64 MMHG | OXYGEN SATURATION: 98 % | HEART RATE: 96 BPM | SYSTOLIC BLOOD PRESSURE: 112 MMHG

## 2023-08-10 DIAGNOSIS — M54.16 LUMBAR RADICULOPATHY: Primary | ICD-10-CM

## 2023-08-10 DIAGNOSIS — M43.16 SPONDYLOLISTHESIS OF LUMBAR REGION: ICD-10-CM

## 2023-08-10 DIAGNOSIS — M48.061 LUMBAR FORAMINAL STENOSIS: ICD-10-CM

## 2023-08-10 DIAGNOSIS — M51.9 LUMBAR DISC DISEASE: ICD-10-CM

## 2023-08-10 DIAGNOSIS — M54.42 CHRONIC LEFT-SIDED LOW BACK PAIN WITH LEFT-SIDED SCIATICA: ICD-10-CM

## 2023-08-10 DIAGNOSIS — G89.29 CHRONIC LEFT-SIDED LOW BACK PAIN WITH LEFT-SIDED SCIATICA: ICD-10-CM

## 2023-08-10 PROCEDURE — 99214 OFFICE O/P EST MOD 30 MIN: CPT | Performed by: PHYSICAL MEDICINE & REHABILITATION

## 2023-08-10 PROCEDURE — 3074F SYST BP LT 130 MM HG: CPT | Performed by: PHYSICAL MEDICINE & REHABILITATION

## 2023-08-10 PROCEDURE — 3078F DIAST BP <80 MM HG: CPT | Performed by: PHYSICAL MEDICINE & REHABILITATION

## 2023-08-10 NOTE — PROGRESS NOTES
Low Back Pain H & P    Chief Complaint: Patient presents with: Follow - Up: LOV: 2023 Last two weeks she started getting discomfort in the lower back in the middle. Pain can be a 7/10 but not consistent. She states she had sciatica pain one night on the left side. She does not know what triggers it. She took aleve three times in the past.     Nursing note reviewed and verified. Patient was last seen on 2023. She had purchased herself a kneeling chair at home which has helped. She would like to get one for work. Over the last 2 weeks, she has been having midline low back pain which ranges from 0-7/10. On average, this pain is a 5/10. One time last week, she had left posterior leg pain which lasted for a few minutes. She is looking into doing restorative yoga. Description of the Pain  The pain is worse sitting at work over the last 2 weeks. The pain is more consistent now. It was rare before this. There is no numbness. There is no tingling in the legs. There is not weakness in both legs. Past Medical History   Past Medical History:   Diagnosis Date    Kidney stone     OTHER DISEASES     recent pink eye . Cleared up at this time       Past Surgical History   Past Surgical History:   Procedure Laterality Date    CYSTOURETHROSCOPY  3/12/2014    Procedure: LITHOTRIPSY WITH CYSTOSCOPY, STENT PLACEMENT;  Surgeon: Anthony Wall MD;  Location: [address] [place] [address]    FRAGMENTING OF KIDNEY STONE  3/12/2014    Procedure: LITHOTRIPSY WITH CYSTOSCOPY, STENT PLACEMENT;  Surgeon:  Anthony Wall MD;  Location: [address] [place] [address]    HEMORRHOIDECTOMY            OTHER SURGICAL HISTORY      tubal ligation    OTHER SURGICAL HISTORY      ovarion cyst    OTHER SURGICAL HISTORY  3/25/14    cysto stent removal - Dr. Yaya Medrano History   Family History   Problem Relation Age of Onset    Stroke Mother     Other (dementia) Mother     Diabetes Father     Other (kidney stones) Sister        Social History   Social History    Socioeconomic History      Marital status:       Spouse name: Not on file      Number of children: Not on file      Years of education: Not on file      Highest education level: Not on file    Occupational History      Not on file    Tobacco Use      Smoking status: Former        Years: 25.00        Types: Cigarettes        Quit date: 1998        Years since quittin.6      Smokeless tobacco: Never      Tobacco comments: quit: over 10yrs ago    Substance and Sexual Activity      Alcohol use: Yes        Alcohol/week: 1.0 - 2.0 standard drink of alcohol        Types: 1 - 2 Glasses of wine per week        Comment: social       Drug use: No      Sexual activity: Not on file    Other Topics      Concerns:        Caffeine Concern: No        Exercise: Not Asked        Seat Belt: Not Asked        Special Diet: No        Stress Concern: Not Asked        Weight Concern: Yes    Social History Narrative       with 3 children     Social Determinants of Health  Financial Resource Strain: Not on file  Food Insecurity: Not on file  Transportation Needs: Not on file  Physical Activity: Not on file  Stress: Not on file  Social Connections: Not on file  Housing Stability: Not on file    PE:  The patient does appear in her stated age in no distress. The patient is well groomed. Psychiatric:  The patient is alert and oriented x 3. The patient has a normal affect and mood. Respiratory:  No acute respiratory distress. Patient does not have a cough. HEENT:  Extraocular muscles are intact. There is no kern icterus. Pupils are equal, round, and reactive to light. No redness or discharge bilaterally. Skin:  There are no rashes or lesions.     Vitals:   08/10/23  0831   BP: 112/64   Pulse: 96       Gait:    Gait: Normal gait   Sit to Stand: no difficulty        Scoliosis: No scoliosis present     Lumbar Spine Palpation:    Spinous Processes: Tender at L5 and S1   Z-joints: Tender at  bilateral L5-S1   SIJ: Tender at right SIJ   Piriformis Muscle: Tender at left Piriformis muscle(s)   Greater Trochanteric Bursa: Tender at right Greater Trochanteric Bursa(s)     Vascular lower extremity:   Dorsalis pedis pulse-RIGHT 2+   Dorsalis pedis pulse-LEFT 2+   Tibialis posterior pulse-RIGHT 2+   Tibialis posterior pulse-LEFT 2+     Neurological Lower Extremity:    Light Touch Sensation: Intact in bilateral Lower Extremities   LE Muscle Strength: All LE strength measurements 5/5 except:  Gluteus medius RIGHT:   4+/5  Gluteus medius LEFT:   4+/5  Hamstring RIGHT:   4+/5  Hamstring LEFT:   4/5   RIGHT plantar reflexes: downward response   LEFT plantar reflexes: downward response   Reflexes: 2+ in bilateral lower extremities     Assessment  1. left > right L5 radiculopathy    2. L5-S1 left mild foraminal stenosis    3. L4-5 grade 1 stable spondylolisthesis    4. L5-S1 right mild-mod/left mod far lateral & mild central, L4-5 left mild far lateral bulging discs    5. Chronic left-sided low back pain with left-sided sciatica         Plan  I filled out her work forms with her today so that she can have a kneeling chair at work. She will continue with her work outs. She does not need any injections at this time, but if the pain worsens, she might need to have repeat bilateral L5 TFESI's. She will follow up in 3-6 months or sooner if needed. The patient understands and agrees with the stated plan. Irving Wylie MD  8/10/2023

## 2023-08-10 NOTE — PATIENT INSTRUCTIONS
Plan  I filled out her work forms with her today so that she can have a kneeling chair at work. She will continue with her work outs. She does not need any injections at this time, but if the pain worsens, she might need to have repeat bilateral L5 TFESI's. She will follow up in 3-6 months or sooner if needed.

## 2023-09-21 ENCOUNTER — TELEPHONE (OUTPATIENT)
Dept: PHYSICAL MEDICINE AND REHAB | Facility: CLINIC | Age: 63
End: 2023-09-21

## 2023-09-27 NOTE — TELEPHONE ENCOUNTER
Spoke with Minoo Kennedy and informed her that Sana Villalobos gave the approval to try the [place] chair. Nothing further needed at this time.

## 2023-10-09 ENCOUNTER — TELEPHONE (OUTPATIENT)
Dept: PHYSICAL MEDICINE AND REHAB | Facility: CLINIC | Age: 63
End: 2023-10-09

## 2023-10-09 DIAGNOSIS — M54.16 LUMBAR RADICULOPATHY: Primary | ICD-10-CM

## 2023-10-09 NOTE — TELEPHONE ENCOUNTER
Initiated authorization for Bilateral L5-S1 Lumbar Transforaminal Epidural Injection with fluoroscopy procedure.  Dx code M54.16 facility [place] with [employer] Plan CPT Code 08999-26   Tracking ID # [id number]   Status Pending auth

## 2023-10-17 NOTE — TELEPHONE ENCOUNTER
Patient has been scheduled for Bilateral L5 TFESIs  on 10/20/2023 at the [address]  with Nacho Davis. -Anesthesia type: local.  -If scheduling [facility] covid testing required for all procedures whether patient is vaccinated or not. -Patient informed not to eat or drink anything after midnight the night prior to the procedure, if being sedated. (For afternoon injections: Patient to fast 6-8 hours prior to procedure with IVCS/MAC. )  -Patient was advised that if he/she does receive the covid vaccine it needs to be at least 2 weeks before or after the injection. -Medications and allergies reviewed. -Patient reminded to hold NSAIDs (Ibuprofen, ASA 81, Aleve, Naproxen, Mobic, Diclofenac, Etodolac, Celebrex etc.) for 3 days prior to [place]  if BMI is greater than 35. For Cervical injections only hold multivitamins, Vitamin E, Fish Oil, Phentermine (Lomaira) for 7 days prior to injection and NSAIDS.  mg to be held for 7 days prior to injections.  -If patient is receiving MAC/IVCS Phentermine Alcario Comas), Adlyxin (Lixisenatide), Bydureon BCise (Exenatide-release), Byetta (Exenatide), Mounjaro (Tirezepatide), Ozempic (Semaglutide), Rybelsus (oral demaglutide), Saxenda (Liraglutide), Trulicity (Dulaglutide), Victoriza (Liraglutide), Wegovy (Semaglutide), Berberine (oral natures ozempic) will need to be held for 7 days prior to injection.  -If patient's BMI is greater than 50. Patient is unable to get IVCS/MAC at [address] (Options: Patient can go to [facility] under MAC or ENDO under IVCS-reminder physician's slots at ENDO are limited. OR Patient can have the procedure done under local anesthesia at [address] [place] [address] with Valium ( per physician's preference.)    -If on blood thinner clearance has been received to hold this medication by provider.   -Patient informed he/she will need a  to and from procedure. -[facility] is located in the [facility] 1st floor.  Patient may park in the yellow or purple parking    Patient verbalized understanding and agrees with plan.  -----> Scheduled in Epic: Yes  -----> Scheduled in [employer]/Surgical Case Request: Yes

## 2023-10-17 NOTE — TELEPHONE ENCOUNTER
Initiated authorization for Bilateral L5 TFESIs CPT [id number] dx:M54.16 to be done at [facility] with [employer]  Status: Approved-authorization is not required per health plan

## 2023-11-30 ENCOUNTER — OFFICE VISIT (OUTPATIENT)
Dept: PHYSICAL MEDICINE AND REHAB | Facility: CLINIC | Age: 63
End: 2023-11-30
Payer: COMMERCIAL

## 2023-11-30 VITALS — BODY MASS INDEX: 28.28 KG/M2 | HEIGHT: 66 IN | HEART RATE: 87 BPM | OXYGEN SATURATION: 99 % | WEIGHT: 176 LBS

## 2023-11-30 DIAGNOSIS — M54.16 LUMBAR RADICULOPATHY: Primary | ICD-10-CM

## 2023-11-30 DIAGNOSIS — M48.061 LUMBAR FORAMINAL STENOSIS: ICD-10-CM

## 2023-11-30 DIAGNOSIS — M43.16 SPONDYLOLISTHESIS OF LUMBAR REGION: ICD-10-CM

## 2023-11-30 DIAGNOSIS — M51.9 LUMBAR DISC DISEASE: ICD-10-CM

## 2023-11-30 PROCEDURE — 3008F BODY MASS INDEX DOCD: CPT | Performed by: PHYSICAL MEDICINE & REHABILITATION

## 2023-11-30 PROCEDURE — 99213 OFFICE O/P EST LOW 20 MIN: CPT | Performed by: PHYSICAL MEDICINE & REHABILITATION

## 2023-11-30 NOTE — PROGRESS NOTES
Low Back Pain H & P    Chief Complaint:   Chief Complaint   Patient presents with    Follow - Up     LOV 08/10/23 Pt is here for a F/U on left > right L5 radiculopathy. XR of lumbar spine was completed. No N/T , No PT . Was given an injection 10/20/23 and states it gave her relief. States her pain is very dull. Takes aleve PRN  to ease the pain . Pain 2/10. Nursing note reviewed and verified. Patient was last seen on 8/10/2023. I did the bilateral L5 TFESI's on 10/20/2023 which have helped her 99%. She will have some right an central low back dull pain at times. She has been doing her HEP. Description of the Pain  The pain is located in the right and central low back. The pain does not radiate. .  The pain at its best is 0/10. The pain at its worst is 4/10. The pain is currently  1/10. The pain is described as a(n) dull sensation. The pain is worse in the evening. There is no numbness. There is no tingling in the legs. There is not weakness in both legs. Past Medical History   Past Medical History:   Diagnosis Date    Kidney stone     OTHER DISEASES     recent pink eye . Cleared up at this time       Past Surgical History   Past Surgical History:   Procedure Laterality Date    CYSTOURETHROSCOPY  3/12/2014    Procedure: LITHOTRIPSY WITH CYSTOSCOPY, STENT PLACEMENT;  Surgeon: Virginia Heller MD;  Location: [address] [place] [address]    FRAGMENTING OF KIDNEY STONE  3/12/2014    Procedure: LITHOTRIPSY WITH CYSTOSCOPY, STENT PLACEMENT;  Surgeon:  Virginia Heller MD;  Location: [address] [place] [address]    HEMORRHOIDECTOMY            OTHER SURGICAL HISTORY      tubal ligation    OTHER SURGICAL HISTORY      ovarion cyst    OTHER SURGICAL HISTORY  3/25/14    cysto stent removal - Dr. Ribeiro Au History   Family History   Problem Relation Age of Onset    Stroke Mother     Other (dementia) Mother     Diabetes Father     Other (kidney stones) Sister     Diabetes Brother Recent heart attack and stroke (2023)       Social History   Social History     Socioeconomic History    Marital status:      Spouse name: Not on file    Number of children: Not on file    Years of education: Not on file    Highest education level: Not on file   Occupational History    Not on file   Tobacco Use    Smoking status: Former     Years: 25     Types: Cigarettes     Quit date: 1998     Years since quittin.9    Smokeless tobacco: Never    Tobacco comments:     quit: over 10yrs ago   Substance and Sexual Activity    Alcohol use: Yes     Alcohol/week: 1.0 - 2.0 standard drink of alcohol     Types: 1 - 2 Glasses of wine per week     Comment: social     Drug use: No    Sexual activity: Not on file   Other Topics Concern    Caffeine Concern No    Exercise Not Asked    Seat Belt Not Asked    Special Diet No    Stress Concern Not Asked    Weight Concern Yes   Social History Narrative     with 3 children      Social Determinants of Health     Financial Resource Strain: Not on file   Food Insecurity: Not on file   Transportation Needs: Not on file   Physical Activity: Not on file   Stress: Not on file   Social Connections: Not on file   Housing Stability: Not on file       PE:  The patient does appear in her stated age in no distress. The patient is well groomed. Psychiatric:  The patient is alert and oriented x 3. The patient has a normal affect and mood. Respiratory:  No acute respiratory distress. Patient does not have a cough. HEENT:  Extraocular muscles are intact. There is no kern icterus. Pupils are equal, round, and reactive to light. No redness or discharge bilaterally. Skin:  There are no rashes or lesions.     Vitals:  Vitals:    23 0836   Pulse: 87       Gait:    Gait: Normal gait   Sit to Stand: no difficulty      Lumbar Spine:    Scoliosis: Thoracic levoscoliosis that is mild and Lumbar dextroscoliosis that is mild     Lumbar Spine Palpation:    Spinous Processes: Non-tender for all Spinous Processes   Z-joints: Non-tender for all Z-joints   SIJ: Non-tender for bilateral SIJ   Piriformis Muscle: Non-tender bilateral Piriformis muscles   Greater Trochanteric Bursa: Non-tender for bilateral Greater Trochanteric Bursa     Vascular lower extremity:   Dorsalis pedis pulse-RIGHT 2+   Dorsalis pedis pulse-LEFT 2+   Tibialis posterior pulse-RIGHT 2+   Tibialis posterior pulse-LEFT 2+     Neurological Lower Extremity:    Light Touch Sensation: Intact in bilateral Lower Extremities   LE Muscle Strength: All LE strength measurements 5/5 except:  Gluteus medius LEFT:   4+/5  Hamstring LEFT:   4+/5   RIGHT plantar reflexes: downward response   LEFT plantar reflexes: downward response   Reflexes: 2+ in bilateral lower extremities     Assessment  1. left > right L5 radiculopathy    2. L5-S1 right mild-mod/left mod far lateral & mild central, L4-5 left mild far lateral bulging discs    3. L5-S1 left mild foraminal stenosis    4. L4-5 grade 1 stable spondylolisthesis         Plan  She will follow up in 4 months or sooner if needed. The patient will continue with her home exercise program.    The patient does not need any pain medications at this time. The patient understands and agrees with the stated plan. Kati Ordaz MD  11/30/2023

## 2023-11-30 NOTE — PATIENT INSTRUCTIONS
Plan  She will follow up in 4 months or sooner if needed. The patient will continue with her home exercise program.    The patient does not need any pain medications at this time.

## 2024-02-22 ENCOUNTER — TELEPHONE (OUTPATIENT)
Dept: PHYSICAL MEDICINE AND REHAB | Facility: CLINIC | Age: 64
End: 2024-02-22

## 2024-02-22 DIAGNOSIS — M54.16 LUMBAR RADICULOPATHY: Primary | ICD-10-CM

## 2024-02-22 NOTE — TELEPHONE ENCOUNTER
Location of Pain: low back, R>L.   denies radiation or N/T  Old or new pain: old  Date Pain Began: flare for the past 2 weeks            If the following symptoms are identified route high priority and verbally notify provider: Bowel/bladder incontinence, new/acute weakness, signs of infection (fever, redness, swelling, drainage), HA the worsens while standing and improves while laying.    Numeric Rating Scale:  Pain at Present:  5                                                                                                            (No Pain) 0  to  10 (Worst Pain)                 Minimum Pain:   3  Maximum Pain  5    Distribution of Pain:  more to right side.  Quality of Pain:   deep ache and at times sharp  Aggravating Factors:  notes previous foot surgery done at [facility] to R great toe (bone spur). Was wearing boot and notes trigger may be from overcompensating to one side    Current pain treatment: OTC medications using Aleve prn with onset of flare 2 weeks ago. Nothing since    LOV:11/30/2023 Beck Cagle MD   NOV: Visit date not found     Summary of patient request: proceed with injections

## 2024-02-27 NOTE — TELEPHONE ENCOUNTER
Please see if she is having the same pain that she has had in the past.  If so, then please set her up for repeat bilateral L5 TFESI's.  She will need to follow up after this.

## 2024-02-28 ENCOUNTER — TELEPHONE (OUTPATIENT)
Dept: PHYSICAL MEDICINE AND REHAB | Facility: CLINIC | Age: 64
End: 2024-02-28

## 2024-02-28 DIAGNOSIS — M54.16 LUMBAR RADICULOPATHY: Primary | ICD-10-CM

## 2024-02-28 NOTE — TELEPHONE ENCOUNTER
Spoke with patient who stated the pain is similar to what she had in the past. States pain is in the low mid back. Pain was shooting, but has now subsided and is a constant dull pain. States pain was on the right leg, but has subsided, but is . Pain still in the left leg, pain is in the left knee radiating up the back of the thigh.     Informed patient will relay update on to  to confirm repeat injection.

## 2024-02-28 NOTE — TELEPHONE ENCOUNTER
Initiated authorization for Bilateral L5 TFESIs CPT 97829-82 dx:M54.16 to be done at [facility] with [employer]  Case #[id number]  Status: Approved-authorization is not required per health plan however may be subject to review once claim is submitted

## 2024-04-10 ENCOUNTER — TELEPHONE (OUTPATIENT)
Dept: PHYSICAL MEDICINE AND REHAB | Facility: CLINIC | Age: 64
End: 2024-04-10

## 2024-04-10 NOTE — TELEPHONE ENCOUNTER
Pt phoned to state that she is feeling \" Just fine\" after the injection and is experiencing no side effects

## 2024-04-10 NOTE — TELEPHONE ENCOUNTER
Condition update after Procedure.    - Patient had Bilateral L5 transforaminal epidural steroid injection on 03/22/2024 with Dr. Cagle.  - 95% relief  \"or more\".    If pain is worse:  - Pain level prior to procedure:   4  - Current pain level:      - Pain location: \"A little higher than before\"; slightly higher than the low back.  - Pain description:  Intermittent dull pain  - Current pain treatment:  N/a - patient denies RX  - Current prescription pain medications/dosing: n/a - patient denies RX    - LOV: 11/30/2023 Beck Cagle MD    - NOV: 6/24/2024 Beck Cagle MD   - Plan from LOV: \"She will follow up in 4 months or sooner if needed.  The patient will continue with her home exercise program.  The patient does not need any pain medications at this time.\"

## 2024-06-19 ENCOUNTER — OFFICE VISIT (OUTPATIENT)
Dept: PHYSICAL MEDICINE AND REHAB | Facility: CLINIC | Age: 64
End: 2024-06-19

## 2024-06-19 VITALS — BODY MASS INDEX: 28 KG/M2 | WEIGHT: 176 LBS | HEART RATE: 100 BPM | OXYGEN SATURATION: 99 %

## 2024-06-19 DIAGNOSIS — M48.061 LUMBAR FORAMINAL STENOSIS: ICD-10-CM

## 2024-06-19 DIAGNOSIS — M54.16 LUMBAR RADICULOPATHY: Primary | ICD-10-CM

## 2024-06-19 DIAGNOSIS — M43.16 SPONDYLOLISTHESIS OF LUMBAR REGION: ICD-10-CM

## 2024-06-19 DIAGNOSIS — G89.29 CHRONIC LEFT-SIDED LOW BACK PAIN WITH LEFT-SIDED SCIATICA: ICD-10-CM

## 2024-06-19 DIAGNOSIS — M54.42 CHRONIC LEFT-SIDED LOW BACK PAIN WITH LEFT-SIDED SCIATICA: ICD-10-CM

## 2024-06-19 DIAGNOSIS — M51.9 LUMBAR DISC DISEASE: ICD-10-CM

## 2024-06-19 PROCEDURE — 99213 OFFICE O/P EST LOW 20 MIN: CPT | Performed by: PHYSICAL MEDICINE & REHABILITATION

## 2024-06-19 RX ORDER — BUPROPION HYDROCHLORIDE 300 MG/1
1 TABLET ORAL EVERY MORNING
COMMUNITY

## 2024-06-19 RX ORDER — PHENDIMETRAZINE TARTRATE 105 MG/1
CAPSULE, EXTENDED RELEASE ORAL
COMMUNITY
Start: 2024-05-18

## 2024-06-19 NOTE — PROGRESS NOTES
Low Back Pain H & P    Chief Complaint:   Chief Complaint   Patient presents with    Follow - Up     24 Bilateral L5 TFESI. 23 LOV. States 98% relief after injection. Denies t/n. Pain 1/10. No pain meds.      Nursing note reviewed and verified.    Patient was last seen on 2023.  I did the bilateral L5 TFESI's on 3/22/2024 which helped her 98%. She has started to have an intermittent return of the pain.  She is doing her HEP about 4 days per week.    Description of the Pain  The pain is located in the left low back.    The pain does not radiate.  She did have left medial thigh and lower leg pain a few weeks ago which resolved on its own.    The pain at its best is 1/10. The pain at its worst is 3/10. The pain is currently  1/10.  The pain is described as a(n) aching sensation.    The pain is worse sitting.    The pain is better standing.  There is no numbness.  There is no tingling in the legs.  There is not weakness in both legs.     Past Medical History   Past Medical History:    Kidney stone    OTHER DISEASES    recent pink eye .  Cleared up at this time       Past Surgical History   Past Surgical History:   Procedure Laterality Date    Cystourethroscopy  3/12/2014    Procedure: LITHOTRIPSY WITH CYSTOSCOPY, STENT PLACEMENT;  Surgeon: Glenn Kinsey MD;  Location: [facility]    Fragmenting of kidney stone  3/12/2014    Procedure: LITHOTRIPSY WITH CYSTOSCOPY, STENT PLACEMENT;  Surgeon: Glenn Kinsey MD;  Location: [facility]    Hemorrhoidectomy            Other surgical history      tubal ligation    Other surgical history      ovarion cyst    Other surgical history  3/25/14    cysto stent removal - Dr. Freeman    Tubal ligation         Family History   Family History   Problem Relation Age of Onset    Stroke Mother     Other (dementia) Mother     Diabetes Father     Other (kidney stones) Sister     Diabetes Brother         Recent heart attack and stroke (2023)        Social History   Social History     Socioeconomic History    Marital status:      Spouse name: Not on file    Number of children: Not on file    Years of education: Not on file    Highest education level: Not on file   Occupational History    Not on file   Tobacco Use    Smoking status: Former     Current packs/day: 0.00     Types: Cigarettes     Start date: 1973     Quit date: 1998     Years since quittin.4    Smokeless tobacco: Never    Tobacco comments:     quit: over 10yrs ago   Vaping Use    Vaping status: Never Used   Substance and Sexual Activity    Alcohol use: Yes     Alcohol/week: 1.0 - 2.0 standard drink of alcohol     Types: 1 - 2 Glasses of wine per week     Comment: social     Drug use: No    Sexual activity: Not on file   Other Topics Concern    Caffeine Concern No    Exercise Not Asked    Seat Belt Not Asked    Special Diet No    Stress Concern Not Asked    Weight Concern Yes   Social History Narrative     with 3 children      Social Determinants of Health     Financial Resource Strain: Patient Declined (2024)    Received from [facility]    Overall Financial Resource Strain (CARDIA)     Difficulty of Paying Living Expenses: Patient declined   Food Insecurity: Patient Declined (2024)    Received from [facility]    Hunger Vital Sign     Worried About Running Out of Food in the Last Year: Patient declined     Ran Out of Food in the Last Year: Patient declined   Transportation Needs: Patient Declined (2024)    Received from [facility]    PRAPARE - Transportation     Lack of Transportation (Medical): Patient declined     Lack of Transportation (Non-Medical): Patient declined   Physical Activity: Inactive (2024)    Received from [facility]    Exercise Vital Sign     Days of Exercise per Week: 0 days     Minutes of Exercise per Session: 0 min   Stress: Stress Concern Present  (5/24/2024)    Received from [facility]    [place] of [employer]ational [employer] - Occupational Stress Questionnaire     Feeling of Stress : To some extent   Social Connections: Unknown (5/24/2024)    Received from [facility]    Social Connection and Isolation Panel [NHANES]     Frequency of Communication with Friends and Family: More than three times a week     Frequency of Social Gatherings with Friends and Family: Once a week     Attends [employer] Services: Patient declined     Active Member of Clubs or Organizations: Patient declined     Attends Club or Organization Meetings: 1 to 4 times per year     Marital Status:    Housing Stability: Patient Declined (5/24/2024)    Received from [facility]    Housing Stability Vital Sign     Unable to Pay for Housing in the Last Year: Patient declined     Number of Times Moved in the Last Year: Not on file     Homeless in the Last Year: Patient declined       PE:  The patient does appear in her stated age in no distress.  The patient is well groomed.    Psychiatric:  The patient is alert and oriented x 3.  The patient has a normal affect and mood.      Respiratory:  No acute respiratory distress. Patient does not have a cough.    HEENT:  Extraocular muscles are intact. There is no kern icterus. Pupils are equal, round, and reactive to light. No redness or discharge bilaterally.    Skin:  There are no rashes or lesions.    Vitals:  Vitals:    06/19/24 1157   Pulse: 100       Gait:    Gait: Normal gait   Sit to Stand: no difficulty      Lumbar Spine:    Scoliosis: No scoliosis present     Lumbar Spine Palpation:    Spinous Processes: Non-tender for all Spinous Processes   Z-joints: Non-tender for all Z-joints   SIJ: Non-tender for bilateral SIJ   Piriformis Muscle: Non-tender bilateral Piriformis muscles   Greater Trochanteric Bursa: Non-tender for bilateral Greater Trochanteric Bursa     Vascular lower  extremity:   Dorsalis pedis pulse-RIGHT 2+   Dorsalis pedis pulse-LEFT 2+   Tibialis posterior pulse-RIGHT 2+   Tibialis posterior pulse-LEFT 2+     Neurological Lower Extremity:    Light Touch Sensation: Intact in bilateral Lower Extremities   LE Muscle Strength: All LE strength measurements 5/5 except:  Hamstring RIGHT:   4+/5  Hamstring LEFT:   4+/5   RIGHT plantar reflexes: downward response   LEFT plantar reflexes: downward response   Reflexes: 2+ in bilateral lower extremities     Assessment  1. left > right L5 radiculopathy    2. L5-S1 left mild foraminal stenosis    3. L5-S1 right mild-mod/left mod far lateral & mild central, L4-5 left mild far lateral bulging discs    4. L4-5 grade 1 stable spondylolisthesis    5. Chronic left-sided low back pain with left-sided sciatica         Plan  She will become more consistent with her home exercise program.    The patient does not need any injections at this time.    The patient does not need any pain medications at this time.    She will follow up in 3-6 months or sooner if needed.    The patient understands and agrees with the stated plan.  Beck Cagle MD  6/19/2024

## 2024-07-12 ENCOUNTER — TELEPHONE (OUTPATIENT)
Dept: PHYSICAL MEDICINE AND REHAB | Facility: CLINIC | Age: 64
End: 2024-07-12

## 2024-07-12 DIAGNOSIS — M25.551 RIGHT HIP PAIN: Primary | ICD-10-CM

## 2024-07-12 NOTE — TELEPHONE ENCOUNTER
Patient calling to inform that she is in need of another injection as the one she has a few month ago is wearing off. Please call to follow up.

## 2024-07-15 NOTE — TELEPHONE ENCOUNTER
LOV: 6/19/2024 w/ Dr. Cagle  LOV PLAN:  \"Plan  She will become more consistent with her home exercise program.     The patient does not need any injections at this time.     The patient does not need any pain medications at this time.     She will follow up in 3-6 months or sooner if needed.\"      NOTE:  Last injection on 3/22/2024 Bilateral L5 TFESI w/ 95% relief    S/W patient and she reports that her pain is not as bad yet, but it is beginning to return- patient no longer experiences the \"jerking\" from her back to make her legs feel like giving out.  Sciatic is returning on LEFT side-    Patient states she is not sure if pulled a groin muscle on RIGHT side. This pain is newer-has not experienced this before. States she is not sure if it is from getting in and out of 's truck, but this is not new movement. States it sometimes hurts/spasms and sometimes doesn't.     Location of Pain: lower back both sides, left leg and right groin area (new-feels like a spasm per patient report)  Old or new pain: Old and new (new is right groin area)  Date Pain Began: July 1st            Patient notes more \"urgency\" with bladder. But no sudden onset of incontinence.  Denies red flag symptoms.    Numeric Rating Scale:  Pain at Present:  4/10                                                                                                             (No Pain) 0  to  10 (Worst Pain)                 Minimum Pain:   3  Maximum Pain  5  (Spasms to right groin can be 6/10)    Distribution of Pain:    bilateral  Quality of Pain:   aching, sharp/stabbing, and spasms  Aggravating Factors:    Sitting and Walking  Current pain treatment:  lying on floor and doing mild stretches . Took 1 of 's pain pills - patient says she fells asleep, so thinks it works (did not know name of ). Took Aleve or Advil back and arthritis for 2 days week before - with some relief.    LOV:6/19/2024 Beck Cagle MD   NOV: 10/3/2024 Beck Cagle MD      Summary of patient request: Patient is hoping to get scheduled for injections again.  Patient does have a new pain to right groin. But all else is previous pain coming back gradually. Patient is wondering if something can be prescribed for pain. Patient will call back once she finds out the name of her 's pain medication.      This RN recommended that patient try heat or ice to see if that will offer some additional relief.

## 2024-07-17 NOTE — TELEPHONE ENCOUNTER
Pt called back to provide name of medication she has taken: pt took aleve: muscle & back pain and also hydrocodone-acetaminophen  mg. Pt states the hydrocodone-acetaminophen  in .

## 2024-07-18 NOTE — TELEPHONE ENCOUNTER
Message below noted regarding specific medications patient tried.   Condition update from 7/15/24 still pending 's review and recommendations.

## 2024-07-19 ENCOUNTER — TELEPHONE (OUTPATIENT)
Dept: PHYSICAL MEDICINE AND REHAB | Facility: CLINIC | Age: 64
End: 2024-07-19

## 2024-07-19 PROBLEM — M25.551 RIGHT HIP PAIN: Status: ACTIVE | Noted: 2024-07-19

## 2024-07-19 RX ORDER — HYDROCODONE BITARTRATE AND ACETAMINOPHEN 10; 325 MG/1; MG/1
1 TABLET ORAL EVERY 6 HOURS PRN
Qty: 30 TABLET | Refills: 0 | Status: SHIPPED | OUTPATIENT
Start: 2024-07-19 | End: 2024-08-18

## 2024-07-19 NOTE — TELEPHONE ENCOUNTER
Received approval for coverage of Hydrocodone-Acetaminophen  MG Oral Tablet (Norco). Placed in RN bin.

## 2024-07-19 NOTE — TELEPHONE ENCOUNTER
Patient called to inquire why [facility] was [facility] to [facility] [place] [facility] [address]  This RN advised that the [facility] the [facility] [place] did come up as the first pharmacy.  Patient advised it was ok, and she would go to that pharmacy. She stated she is hoping she doesn't need any refills on the Norco, as this is her first script for it and hoping it will work without needing to continue it.    This RN advised if she does need refill and prefers it to go to the other pharmacy, to be sure to let us know at the time. Patient verbalized understanding.

## 2024-07-22 ENCOUNTER — HOSPITAL ENCOUNTER (OUTPATIENT)
Dept: GENERAL RADIOLOGY | Facility: HOSPITAL | Age: 64
Discharge: HOME | End: 2024-07-22
Attending: PHYSICAL MEDICINE & REHABILITATION
Payer: COMMERCIAL

## 2024-07-22 DIAGNOSIS — M25.551 RIGHT HIP PAIN: ICD-10-CM

## 2024-07-22 PROCEDURE — 73502 X-RAY EXAM HIP UNI 2-3 VIEWS: CPT | Performed by: PHYSICAL MEDICINE & REHABILITATION

## 2024-07-23 ENCOUNTER — PATIENT MESSAGE (OUTPATIENT)
Dept: PHYSICAL MEDICINE AND REHAB | Facility: CLINIC | Age: 64
End: 2024-07-23

## 2024-07-23 ENCOUNTER — MED REC SCAN ONLY (OUTPATIENT)
Dept: PHYSICAL MEDICINE AND REHAB | Facility: CLINIC | Age: 64
End: 2024-07-23

## 2024-07-23 DIAGNOSIS — M54.16 LUMBAR RADICULOPATHY: Primary | ICD-10-CM

## 2024-07-23 NOTE — TELEPHONE ENCOUNTER
From: Mira Tobin  To: Beck Cagle  Sent: 7/23/2024 1:26 PM CDT  Subject: X-rays completed on 7.23.24    Hi, I had my x-rays taken yesterday and it seems that everything is fine and that I most likely pulled a muscle. Please let me know when we can schedule the next set of injections. Thx much

## 2024-08-05 NOTE — TELEPHONE ENCOUNTER
Patient calling again for Dr Cagle's response.     Patient states his pain is progressively worsening on bilateral low back (sometimes worst in the right and at times the left, or centered), sometimes pain travels both legs and also right groin area.Patient has had trouble sleeping because of the pain.     Pain is a constant ache. Denies spasms.     Tried Norco for pain (took only half a pill), but doesn't prefer to take the medication.     Pain now rated 6/10, worst 8/10. Worst with getting up or sitting. Patient feels better if she bends over as she stretches her back.     Patient asking for an injection.Routing as high priority.

## 2024-08-06 ENCOUNTER — TELEPHONE (OUTPATIENT)
Dept: PHYSICAL MEDICINE AND REHAB | Facility: CLINIC | Age: 64
End: 2024-08-06

## 2024-08-06 DIAGNOSIS — M54.16 LUMBAR RADICULOPATHY: Primary | ICD-10-CM

## 2024-08-06 NOTE — TELEPHONE ENCOUNTER
Initiated authorization for Bilateral L5-S1 TFESI. CPT/HCPCS 83284-19, dx:M54.16 to be done at [facility] with [facility]    Status: Approved - authorization not required  Tracking # [id number]

## 2024-08-06 NOTE — TELEPHONE ENCOUNTER
The hip x-ray is normal and therefore this pain is coming from her spine.  I will do the bilateral L5 TFESI's again.

## 2024-08-07 NOTE — TELEPHONE ENCOUNTER
Patient has been scheduled for Bilateral L5-S1 Transforaminal epidural steroid injections on 08/16/2024 at the [facility] with Dr. Cagle.   -Anesthesia type: LOCAL.  -If scheduling [facility] covid testing required for all procedures whether patient is vaccinated or not.  - Is patient in a nursing home or assisted living? If so injection needs to be scheduled at the hospital. (Per [facility] policy.)  -Patient informed not to eat or drink anything after midnight the night prior to the procedure, if being sedated. (For afternoon injections: Patient to fast minimum of 8 hours prior to procedure with IVCS/MAC. Patient's on weight loss medications/injectables will need to fast 10-12 hours prior to.)   -Patient was advised that if he/she does receive the covid vaccine it needs to be at least 2 weeks before or after the injection.  -Medications and allergies reviewed.  -Patient reminded to hold NSAIDs (Ibuprofen, ASA 81, Aleve, Naproxen, Mobic, Diclofenac, Etodolac, Celebrex etc.) for 3 days prior to LUMBAR FACET JOINT INJECTIONS OR MEDIAL BRANCH BLOCK INJECTIONS  if BMI is greater than 35. For Cervical injections only hold multivitamins, Vitamin E, Fish Oil, Phentermine (Lomaira) for 7 days prior to injection and NSAIDS.   mg to be held for 7 days prior to injections.  -If patient is receiving MAC/IVCS Phentermine (Lomaira), Adlyxin (Lixisenatide), Bydureon BCise (Exenatide-release), Byetta (Exenatide), Mounjaro (Tirezepatide), Ozempic (Semaglutide), Rybelsus (oral demaglutide), Saxenda (Liraglutide), Trulicity (Dulaglutide), Victoriza (Liraglutide), Wegovy (Semaglutide), Berberine (oral natures ozempic) will need to be held for 7 days prior to injection.  -If patient's BMI is greater than 45. Patient is unable to get IVCS/MAC at [facility]. (Options: Patient can go to [facility] under MAC or ENDO under IVCS-reminder physician's slots at ENDO are limited. OR Patient can have the procedure done under local anesthesia at [facility] with Valium ( per  physician's preference.)    -If on blood thinner clearance has been received to hold this medication by provider.   -Patient informed he/she will need a  to and from procedure. EFFECTIVE 12/1/23- Per [facility]: \" Our PAT team will notify the patient that their ride MUST remain onsite for the entirety of their visit if the ride is unable to do so the procedure will be canceled. \"    -[facility] is located in the [facility] 1st floor. Patient may park in the yellow or purple parking.    Follow up appointment has been scheduled for patient on: 10/03/2024    Patient verbalized understanding and agrees with plan.  -----> Scheduled in Epic: Yes  -----> Scheduled in [employer]/Surgical Case Request: Yes

## 2024-10-10 ENCOUNTER — OFFICE VISIT (OUTPATIENT)
Dept: PHYSICAL MEDICINE AND REHAB | Facility: CLINIC | Age: 64
End: 2024-10-10
Payer: COMMERCIAL

## 2024-10-10 VITALS — BODY MASS INDEX: 29.32 KG/M2 | WEIGHT: 176 LBS | HEIGHT: 65 IN

## 2024-10-10 DIAGNOSIS — M54.16 LUMBAR RADICULOPATHY: ICD-10-CM

## 2024-10-10 DIAGNOSIS — M25.372 LEFT ANKLE INSTABILITY: ICD-10-CM

## 2024-10-10 DIAGNOSIS — G89.29 CHRONIC LEFT-SIDED LOW BACK PAIN WITH LEFT-SIDED SCIATICA: ICD-10-CM

## 2024-10-10 DIAGNOSIS — M48.061 LUMBAR FORAMINAL STENOSIS: Primary | ICD-10-CM

## 2024-10-10 DIAGNOSIS — M51.9 LUMBAR DISC DISEASE: ICD-10-CM

## 2024-10-10 DIAGNOSIS — M25.551 RIGHT HIP PAIN: ICD-10-CM

## 2024-10-10 DIAGNOSIS — M54.42 CHRONIC LEFT-SIDED LOW BACK PAIN WITH LEFT-SIDED SCIATICA: ICD-10-CM

## 2024-10-10 PROCEDURE — 3008F BODY MASS INDEX DOCD: CPT | Performed by: PHYSICAL MEDICINE & REHABILITATION

## 2024-10-10 PROCEDURE — 99214 OFFICE O/P EST MOD 30 MIN: CPT | Performed by: PHYSICAL MEDICINE & REHABILITATION

## 2024-10-10 NOTE — PATIENT INSTRUCTIONS
Plan  She will do more abdominal strengthening exercises and gluteal strengthening exercises for the lumbar spine.    The patient will continue with her home exercise program.    She will will see me again for an appointment in 3-4 months, but she might need to have repeat bilateral L5 TFESI's before that.

## 2024-10-10 NOTE — PROGRESS NOTES
Low Back Pain H & P    Chief Complaint:   Chief Complaint   Patient presents with    Follow - Up     LOV 24 pt is here for a follow up. XR of right hip was completed 24. No N/T. Not taking any pain meds or muscle relaxer's. No current physical therapy. Pain 0-1/10     Nursing note reviewed and verified.    Patient was last seen on .  On 2024, I did the bilateral L5 TFESI's which have helped her 95-97%.  She is doing her low back stretches everyday.  She is working on he core strengthening.  She will do leg lifts and stretches.  She will walk the dog.       Description of the Pain  The pain is located in the right low back.    The pain does not radiate..  The pain at its best is 0/10. The pain at its worst is 1/10. The pain is currently  1/10.    There is no numbness.  There is no tingling in the legs.  There is not weakness in both legs.     Past Medical History   Past Medical History:    Kidney stone    OTHER DISEASES    recent pink eye .  Cleared up at this time       Past Surgical History   Past Surgical History:   Procedure Laterality Date    Cystourethroscopy  3/12/2014    Procedure: LITHOTRIPSY WITH CYSTOSCOPY, STENT PLACEMENT;  Surgeon: Glenn Kinsey MD;  Location: [facility]    Fragmenting of kidney stone  3/12/2014    Procedure: LITHOTRIPSY WITH CYSTOSCOPY, STENT PLACEMENT;  Surgeon: Glenn Kinsey MD;  Location: [facility]    Hemorrhoidectomy            Other surgical history      tubal ligation    Other surgical history      ovarion cyst    Other surgical history  3/25/14    cysto stent removal - Dr. Freeman    Tubal ligation         Family History   Family History   Problem Relation Age of Onset    Stroke Mother     Other (dementia) Mother     Diabetes Father     Other (kidney stones) Sister     Diabetes Brother         Recent heart attack and stroke (2023)       Social History   Social History     Socioeconomic History    Marital status:       Spouse name: Not on file    Number of children: Not on file    Years of education: Not on file    Highest education level: Not on file   Occupational History    Not on file   Tobacco Use    Smoking status: Former     Current packs/day: 0.00     Types: Cigarettes     Start date: 1973     Quit date: 1998     Years since quittin.7    Smokeless tobacco: Never    Tobacco comments:     quit: over 10yrs ago   Vaping Use    Vaping status: Never Used   Substance and Sexual Activity    Alcohol use: Yes     Alcohol/week: 1.0 - 2.0 standard drink of alcohol     Types: 1 - 2 Glasses of wine per week     Comment: social     Drug use: No    Sexual activity: Not on file   Other Topics Concern    Caffeine Concern No    Exercise Not Asked    Seat Belt Not Asked    Special Diet No    Stress Concern Not Asked    Weight Concern Yes   Social History Narrative     with 3 children      Social Drivers of Health     Financial Resource Strain: Patient Declined (2024)    Received from [facility]    Overall Financial Resource Strain (CARDIA)     Difficulty of Paying Living Expenses: Patient declined   Food Insecurity: Patient Declined (2024)    Received from [facility]    Hunger Vital Sign     Worried About Running Out of Food in the Last Year: Patient declined     Ran Out of Food in the Last Year: Patient declined   Transportation Needs: Patient Declined (2024)    Received from [facility]    PRAPARE - Transportation     Lack of Transportation (Medical): Patient declined     Lack of Transportation (Non-Medical): Patient declined   Physical Activity: Inactive (2024)    Received from [facility]    Exercise Vital Sign     Days of Exercise per Week: 0 days     Minutes of Exercise per Session: 0 min   Stress: Stress Concern Present (2024)    Received from [facility] of Occupational Health -  Occupational Stress Questionnaire     Feeling of Stress : To some extent   Social Connections: Unknown (5/24/2024)    Received from [facility]    Social Connection and Isolation Panel [NHANES]     Frequency of Communication with Friends and Family: More than three times a week     Frequency of Social Gatherings with Friends and Family: Once a week     Attends Judaism Services: Patient declined     Active Member of Clubs or Organizations: Patient declined     Attends Club or Organization Meetings: 1 to 4 times per year     Marital Status:    Housing Stability: Patient Declined (5/24/2024)    Received from [facility]    Housing Stability Vital Sign     Unable to Pay for Housing in the Last Year: Patient declined     Number of Times Moved in the Last Year: Not on file     Homeless in the Last Year: Patient declined       PE:  The patient does appear in her stated age in no distress.  The patient is well groomed.    Psychiatric:  The patient is alert and oriented x 3.  The patient has a normal affect and mood.      Respiratory:  No acute respiratory distress. Patient does not have a cough.    HEENT:  Extraocular muscles are intact. There is no kern icterus. Pupils are equal, round, and reactive to light. No redness or discharge bilaterally.    Skin:  There are no rashes or lesions.    Vitals:  There were no vitals filed for this visit.    Gait:    Gait: Normal gait   Sit to Stand: no difficulty      Lumbar Spine:    Scoliosis: No scoliosis present     Lumbar Spine Palpation:    Spinous Processes: Tender at L5 and S1   Z-joints: Tender at  right L5-S1   SIJ: Tender at right SIJ   Piriformis Muscle: Tender at bilateral Piriformis muscle(s)   Greater Trochanteric Bursa: Tender at right Greater Trochanteric Bursa(s)     Vascular lower extremity:   Dorsalis pedis pulse-RIGHT 2+   Dorsalis pedis pulse-LEFT 2+   Tibialis posterior pulse-RIGHT 2+   Tibialis posterior pulse-LEFT 2+      Neurological Lower Extremity:    Light Touch Sensation: Intact in bilateral Lower Extremities   LE Muscle Strength: All LE strength measurements 5/5 except:  Hamstring RIGHT:   4+/5  Hamstring LEFT:   4+/5  Extensor Hallucis Longus RIGHT:   4/5  Extensor Hallucis Longus LEFT:   4-/5   RIGHT plantar reflexes: downward response   LEFT plantar reflexes: downward response   Reflexes: 2+ in bilateral lower extremities     Assessment  1. L5-S1 left mild foraminal stenosis    2. L5-S1 right mild-mod/left mod far lateral & mild central, L4-5 left mild far lateral bulging discs    3. left > right L5 radiculopathy    4. Left ankle instability    5. Right hip pain with normal x-ray    6. Chronic left-sided low back pain with left-sided sciatica         Plan  She will do more abdominal strengthening exercises and gluteal strengthening exercises for the lumbar spine.    The patient will continue with her home exercise program.    She will will see me again for an appointment in 3-4 months, but she might need to have repeat bilateral L5 TFESI's before that.    The patient understands and agrees with the stated plan.  Beck Cagle MD  10/10/2024

## 2024-11-18 ENCOUNTER — PATIENT MESSAGE (OUTPATIENT)
Dept: PHYSICAL MEDICINE AND REHAB | Facility: CLINIC | Age: 64
End: 2024-11-18

## 2024-11-18 DIAGNOSIS — M54.16 LUMBAR RADICULOPATHY: Primary | ICD-10-CM

## 2024-11-18 DIAGNOSIS — M51.9 LUMBAR DISC DISEASE: ICD-10-CM

## 2024-11-25 NOTE — TELEPHONE ENCOUNTER
Per  at last office visit 10/10/24: \"Plan  She will do more abdominal strengthening exercises and gluteal strengthening exercises for the lumbar spine.  The patient will continue with her home exercise program.  She will will see me again for an appointment in 3-4 months, but she might need to have repeat bilateral L5 TFESI's before that.\"    Next office visit: 1/30/25  Last injection: 8/16/24 - Bilateral L5 Transforaminal epidural steroid injection    Message sent to patient via [place] for additional information.

## 2024-11-27 ENCOUNTER — TELEPHONE (OUTPATIENT)
Dept: PHYSICAL MEDICINE AND REHAB | Facility: CLINIC | Age: 64
End: 2024-11-27

## 2024-11-27 DIAGNOSIS — M54.16 LUMBAR RADICULOPATHY: Primary | ICD-10-CM

## 2024-11-27 NOTE — TELEPHONE ENCOUNTER
Patient called to ask if Dr. Cagle can proceed with her injections as she is having pain.  It was first to her right side, and more recently to the left side.  This RN entered order for B/L L5 TFESI     LOV: 10/10/2024 w/ Dr. Cagle  LOV PLAN:   \"Plan  She will do more abdominal strengthening exercises and gluteal strengthening exercises for the lumbar spine.     The patient will continue with her home exercise program.     She will will see me again for an appointment in 3-4 months, but she might need to have repeat bilateral L5 TFESI's before that.\"

## 2024-11-27 NOTE — TELEPHONE ENCOUNTER
Initiated authorization for Bilateral L5 TFESI under fluoroscopy guidance. CPT/HCPCS 59710-81 dx:M54.16 to be done at [facility] with [facility]    Status: Approved - No precertification or post-service clinical review is required  Tracking ID # [id number]  Valid: 11/27/24-12/31/24    Authorization is not required based on medical necessity when being performed, however is not a guarantee of payment and may be subject to review once claim is submitted

## 2024-12-23 ENCOUNTER — PATIENT MESSAGE (OUTPATIENT)
Dept: PHYSICAL MEDICINE AND REHAB | Facility: CLINIC | Age: 64
End: 2024-12-23

## 2025-01-03 NOTE — TELEPHONE ENCOUNTER
Patient had Bilateral L5 transforaminal epidural steroid injections on 12/13/24.    Per patient 12/23/24 update: \"I wanted to let you know that I’m having pain in my left lower back area. I feel that I usually have relief by now after my injections. \"    Message sent to patient asking for additional condition update.

## 2025-01-24 NOTE — TELEPHONE ENCOUNTER
She has an appointment on 1/30/2024 and since I need to see her for an evaluation to determine next steps, I will await he re-evaluation at her appointment next week.

## 2025-01-30 ENCOUNTER — OFFICE VISIT (OUTPATIENT)
Dept: PHYSICAL MEDICINE AND REHAB | Facility: CLINIC | Age: 65
End: 2025-01-30
Payer: COMMERCIAL

## 2025-01-30 VITALS — HEIGHT: 65 IN | BODY MASS INDEX: 28.32 KG/M2 | WEIGHT: 170 LBS

## 2025-01-30 DIAGNOSIS — M51.9 LUMBAR DISC DISEASE: ICD-10-CM

## 2025-01-30 DIAGNOSIS — M48.061 LUMBAR FORAMINAL STENOSIS: Primary | ICD-10-CM

## 2025-01-30 DIAGNOSIS — M25.551 RIGHT HIP PAIN: ICD-10-CM

## 2025-01-30 DIAGNOSIS — M43.16 SPONDYLOLISTHESIS OF LUMBAR REGION: ICD-10-CM

## 2025-01-30 DIAGNOSIS — M54.16 LUMBAR RADICULOPATHY: ICD-10-CM

## 2025-01-30 PROCEDURE — 3008F BODY MASS INDEX DOCD: CPT | Performed by: PHYSICAL MEDICINE & REHABILITATION

## 2025-01-30 PROCEDURE — 99214 OFFICE O/P EST MOD 30 MIN: CPT | Performed by: PHYSICAL MEDICINE & REHABILITATION

## 2025-01-30 NOTE — PROGRESS NOTES
Low Back Pain H & P    Chief Complaint:   Chief Complaint   Patient presents with    Follow - Up     INJ 24 Bilateral L5 transforaminal epidural steroid injections, patient reports 0% improvement. Patient reports aching pain is lower back and radiates to tail bone. Pain increases with daily activities, pain 5/10. Denies pain medication and muscle relaxer's. No physical therapy, but continuing HEP.      Nursing note reviewed and verified.    Patient was last seen on 10/10/2024.  On 2024, I did the bilateral L5 TFESI's which did not help the pain. The pain has not worsened she she had these but it did not improve.      Description of the Pain  The pain is located in the central low back.    The pain radiates to the bilateral buttock.  It will radiate into the tailbone. It will be in one or the other buttocks at any time.  She will have left lateral hip pain at times.  The pain at its best is 3/10. The pain at its worst is 5/10. The pain is currently  5/10.  The pain is described as a(n) aching sensation.    The pain is worse sitting, going from sitting to standing, in the evening, and standing can be painful .    The pain is better when bending.  There is no numbness.  There is no tingling in the legs.  There is not weakness in both legs.     Past Medical History   Past Medical History:    Kidney stone    OTHER DISEASES    recent pink eye .  Cleared up at this time       Past Surgical History   Past Surgical History:   Procedure Laterality Date    Cystourethroscopy  3/12/2014    Procedure: LITHOTRIPSY WITH CYSTOSCOPY, STENT PLACEMENT;  Surgeon: Glenn Kinsey MD;  Location: [facility]    Fragmenting of kidney stone  3/12/2014    Procedure: LITHOTRIPSY WITH CYSTOSCOPY, STENT PLACEMENT;  Surgeon: Glenn Kinsey MD;  Location: [facility]    Hemorrhoidectomy            Other surgical history      tubal ligation    Other surgical history      ovarion cyst    Other surgical history   3/25/14    cysto stent removal - Dr. Freeman    Tubal ligation         Family History   Family History   Problem Relation Age of Onset    Stroke Mother     Other (dementia) Mother     Diabetes Father     Other (kidney stones) Sister     Diabetes Brother         Recent heart attack and stroke (2023)       Social History   Social History     Socioeconomic History    Marital status:      Spouse name: Not on file    Number of children: Not on file    Years of education: Not on file    Highest education level: Not on file   Occupational History    Not on file   Tobacco Use    Smoking status: Former     Current packs/day: 0.00     Types: Cigarettes     Start date: 1973     Quit date: 1998     Years since quittin.0    Smokeless tobacco: Never    Tobacco comments:     quit: over 10yrs ago   Vaping Use    Vaping status: Never Used   Substance and Sexual Activity    Alcohol use: Yes     Alcohol/week: 1.0 - 2.0 standard drink of alcohol     Types: 1 - 2 Glasses of wine per week     Comment: social     Drug use: No    Sexual activity: Not on file   Other Topics Concern    Caffeine Concern No    Exercise Not Asked    Seat Belt Not Asked    Special Diet No    Stress Concern Not Asked    Weight Concern Yes   Social History Narrative     with 3 children      Social Drivers of Health     Financial Resource Strain: Patient Declined (2024)    Received from [facility]    Overall Financial Resource Strain (CARDIA)     Difficulty of Paying Living Expenses: Patient declined   Food Insecurity: Patient Declined (2024)    Received from [facility]    Hunger Vital Sign     Worried About Running Out of Food in the Last Year: Patient declined     Ran Out of Food in the Last Year: Patient declined   Transportation Needs: Patient Declined (2024)    Received from [facility]    PRAPARE - Transportation     Lack of Transportation (Medical): Patient  declined     Lack of Transportation (Non-Medical): Patient declined   Physical Activity: Inactive (5/24/2024)    Received from [facility] [place]    Exercise Vital Sign     Days of Exercise per Week: 0 days     Minutes of Exercise per Session: 0 min   Stress: Stress Concern Present (5/24/2024)    Received from [facility] [place]    [employer] - Occupational Stress Questionnaire     Feeling of Stress : To some extent   Social Connections: Unknown (5/24/2024)    Received from [facility] [place]    Social Connection and Isolation Panel [NHANES]     Frequency of Communication with Friends and Family: More than three times a week     Frequency of Social Gatherings with Friends and Family: Once a week     Attends Yarsani Services: Patient declined     Active Member of Clubs or Organizations: Patient declined     Attends Club or Organization Meetings: 1 to 4 times per year     Marital Status:    Housing Stability: Patient Declined (5/24/2024)    Received from [facility] [place]    Housing Stability Vital Sign     Unable to Pay for Housing in the Last Year: Patient declined     Number of Times Moved in the Last Year: Not on file     Homeless in the Last Year: Patient declined       PE:  The patient does appear in her stated age in no distress.  The patient is well groomed.    Psychiatric:  The patient is alert and oriented x 3.  The patient has a normal affect and mood.      Respiratory:  No acute respiratory distress. Patient does not have a cough.    HEENT:  Extraocular muscles are intact. There is no kern icterus. Pupils are equal, round, and reactive to light. No redness or discharge bilaterally.    Skin:  There are no rashes or lesions.    Vitals:  There were no vitals filed for this visit.    Gait:    Gait: Normal gait   Sit to Stand: mild difficulty      Lumbar Spine:    Scoliosis: No scoliosis present     Lumbar Spine Palpation:     Spinous Processes: Tender at L5 and S1   Z-joints: Tender at  bilateral L5-S1   SIJ: Tender at right > left SIJ   Piriformis Muscle: Non-tender bilateral Piriformis muscles   Greater Trochanteric Bursa: Tender at right Greater Trochanteric Bursa(s)     Vascular lower extremity:   Dorsalis pedis pulse-RIGHT 2+   Dorsalis pedis pulse-LEFT 2+   Tibialis posterior pulse-RIGHT 2+   Tibialis posterior pulse-LEFT 2+     Neurological Lower Extremity:    Light Touch Sensation: Intact in bilateral Lower Extremities   LE Muscle Strength: All LE strength measurements 5/5 except:  Hamstring RIGHT:   4/5  Hamstring LEFT:   4/5   RIGHT plantar reflexes: downward response   LEFT plantar reflexes: downward response   Reflexes: 2+ in bilateral lower extremities     Assessment  1. L5-S1 left mild foraminal stenosis    2. L5-S1 right mild-mod/left mod far lateral & mild central, L4-5 left mild far lateral bulging discs    3. left > right L5 radiculopathy    4. L4-5 grade 1 stable spondylolisthesis    5. Right hip pain with normal x-ray         Plan  She will get new lumbar spine flexion and extension x-rays and MRI scan since she did not improve with the last set of injections which have always helped in the past.    The patient will continue with her home exercise program.    The patient will continue with their current pain medications.    She will follow up after having the above images.    The patient understands and agrees with the stated plan.  Beck Cagle MD  1/30/2025

## 2025-01-30 NOTE — PATIENT INSTRUCTIONS
Plan  She will get new lumbar spine flexion and extension x-rays and MRI scan since she did not improve with the last set of injections which have always helped in the past.    The patient will continue with her home exercise program.    The patient will continue with their current pain medications.    She will follow up after having the above images.

## 2025-02-07 ENCOUNTER — HOSPITAL ENCOUNTER (OUTPATIENT)
Dept: GENERAL RADIOLOGY | Age: 65
Discharge: HOME | End: 2025-02-07
Attending: PHYSICAL MEDICINE & REHABILITATION
Payer: COMMERCIAL

## 2025-02-07 ENCOUNTER — HOSPITAL ENCOUNTER (OUTPATIENT)
Dept: MRI IMAGING | Age: 65
Discharge: HOME | End: 2025-02-07
Attending: PHYSICAL MEDICINE & REHABILITATION
Payer: COMMERCIAL

## 2025-02-07 DIAGNOSIS — M54.16 LUMBAR RADICULOPATHY: ICD-10-CM

## 2025-02-07 PROCEDURE — 72148 MRI LUMBAR SPINE W/O DYE: CPT | Performed by: PHYSICAL MEDICINE & REHABILITATION

## 2025-02-07 PROCEDURE — 72110 X-RAY EXAM L-2 SPINE 4/>VWS: CPT | Performed by: PHYSICAL MEDICINE & REHABILITATION

## 2025-02-09 ENCOUNTER — PATIENT MESSAGE (OUTPATIENT)
Dept: PHYSICAL MEDICINE AND REHAB | Facility: CLINIC | Age: 65
End: 2025-02-09

## 2025-02-27 ENCOUNTER — OFFICE VISIT (OUTPATIENT)
Dept: PHYSICAL MEDICINE AND REHAB | Facility: CLINIC | Age: 65
End: 2025-02-27
Payer: COMMERCIAL

## 2025-02-27 VITALS — HEIGHT: 65 IN | BODY MASS INDEX: 28.32 KG/M2 | WEIGHT: 170 LBS

## 2025-02-27 DIAGNOSIS — M48.061 LUMBAR FORAMINAL STENOSIS: ICD-10-CM

## 2025-02-27 DIAGNOSIS — M54.16 LUMBAR RADICULOPATHY: ICD-10-CM

## 2025-02-27 DIAGNOSIS — M51.9 LUMBAR DISC DISEASE: ICD-10-CM

## 2025-02-27 DIAGNOSIS — M43.16 SPONDYLOLISTHESIS OF LUMBAR REGION: Primary | ICD-10-CM

## 2025-02-27 PROCEDURE — 3008F BODY MASS INDEX DOCD: CPT | Performed by: PHYSICAL MEDICINE & REHABILITATION

## 2025-02-27 PROCEDURE — 99214 OFFICE O/P EST MOD 30 MIN: CPT | Performed by: PHYSICAL MEDICINE & REHABILITATION

## 2025-02-27 RX ORDER — TRAMADOL HYDROCHLORIDE 50 MG/1
50 TABLET ORAL EVERY 6 HOURS PRN
Qty: 30 TABLET | Refills: 0 | Status: SHIPPED | OUTPATIENT
Start: 2025-02-27 | End: 2025-03-29

## 2025-02-27 NOTE — PATIENT INSTRUCTIONS
Plan  She will get into the PT in [place] with Tawanna for stabilization.    The patient does not need any injections at this time.    She will let me know how she is doing after she has had 6 sessions of the PT.      She will try Ultram for the pain.    She will follow up in 3 months or sooner if needed.

## 2025-02-27 NOTE — PROGRESS NOTES
Low Back Pain H & P    Chief Complaint:   Chief Complaint   Patient presents with    Follow - Up     LOV 25 pt Is here for a follow up. MRI and XR of spine lumbar was completed 25. No n/t. Not taking any pain meds or muscle relaxer's. Reports pain to be aching and often sharp.  States the pain is radiating down the back of her glutes into the mid thigh area. No current physical therapy.  Pain 5/10     Nursing note reviewed and verified.    Patient was last seen on 2025.   She has had the lumbar spine x-rays and CH[facility]EDU scan.  Her symptoms are unchanged.    Description of the Pain  The pain is located in the central and left > right low back.    The pain radiates to the left > right buttock and left > right posterior thigh..  The pain at its best is 7/10. The pain at its worst is 6/10. The pain is currently  6/10.  The pain is described as a(n) aching sensation.    The pain is worse sitting, standing, and in the evening.    The pain is better with nothing.  There is no numbness.  There is no tingling in the legs.  There is not weakness in both legs.     Past Medical History   Past Medical History:    Kidney stone    OTHER DISEASES    recent pink eye .  Cleared up at this time       Past Surgical History   Past Surgical History:   Procedure Laterality Date    Cystourethroscopy  3/12/2014    Procedure: LITHOTRIPSY WITH CYSTOSCOPY, STENT PLACEMENT;  Surgeon: Glenn Kinsey MD;  Location: [facility]    Fragmenting of kidney stone  3/12/2014    Procedure: LITHOTRIPSY WITH CYSTOSCOPY, STENT PLACEMENT;  Surgeon: Glenn Kinsey MD;  Location: [facility]    Hemorrhoidectomy            Other surgical history      tubal ligation    Other surgical history      ovarion cyst    Other surgical history  3/25/14    cysto stent removal - Dr. Freeman    Tubal ligation         Family History   Family History   Problem Relation Age of Onset    Stroke Mother     Other (dementia) Mother     Diabetes Father      Other (kidney stones) Sister     Diabetes Brother         Recent heart attack and stroke (2023)       Social History   Social History     Socioeconomic History    Marital status:      Spouse name: Not on file    Number of children: Not on file    Years of education: Not on file    Highest education level: Not on file   Occupational History    Not on file   Tobacco Use    Smoking status: Former     Current packs/day: 0.00     Types: Cigarettes     Start date: 1973     Quit date: 1998     Years since quittin.1    Smokeless tobacco: Never    Tobacco comments:     quit: over 10yrs ago   Vaping Use    Vaping status: Never Used   Substance and Sexual Activity    Alcohol use: Yes     Alcohol/week: 1.0 - 2.0 standard drink of alcohol     Types: 1 - 2 Glasses of wine per week     Comment: social     Drug use: No    Sexual activity: Not on file   Other Topics Concern    Caffeine Concern No    Exercise Not Asked    Seat Belt Not Asked    Special Diet No    Stress Concern Not Asked    Weight Concern Yes   Social History Narrative     with 3 children      Social Drivers of Health     Food Insecurity: Patient Declined (2024)    Received from [facility] [place]    Hunger Vital Sign     Worried About Running Out of Food in the Last Year: Patient declined     Ran Out of Food in the Last Year: Patient declined   Transportation Needs: Patient Declined (2024)    Received from [facility] [place]    PRAPARE - Transportation     Lack of Transportation (Medical): Patient declined     Lack of Transportation (Non-Medical): Patient declined   Stress: Stress Concern Present (2024)    Received from [facility] [place]    [employer] - Occupational Stress Questionnaire     Feeling of Stress : To some extent   Housing Stability: Patient Declined (2024)    Received from [facility] [place]    Housing Stability Vital Sign      Unable to Pay for Housing in the Last Year: Patient declined     Number of Times Moved in the Last Year: Not on file     Homeless in the Last Year: Patient declined       PE:  The patient does appear in her stated age in no distress.  The patient is well groomed.    Psychiatric:  The patient is alert and oriented x 3.  The patient has a normal affect and mood.      Respiratory:  No acute respiratory distress. Patient does not have a cough.    HEENT:  Extraocular muscles are intact. There is no kern icterus. Pupils are equal, round, and reactive to light. No redness or discharge bilaterally.    Skin:  There are no rashes or lesions.    Vitals:  There were no vitals filed for this visit.    Gait:    Gait: Normal gait   Sit to Stand: no difficulty      Vascular lower extremity:   Dorsalis pedis pulse-RIGHT 2+   Dorsalis pedis pulse-LEFT 2+   Tibialis posterior pulse-RIGHT 2+   Tibialis posterior pulse-LEFT 2+     Neurological Lower Extremity:    Light Touch Sensation: Intact in bilateral Lower Extremities   LE Muscle Strength: All LE strength measurements 5/5 except:  Hamstring RIGHT:   4+/5  Hamstring LEFT:   4/5   RIGHT plantar reflexes: downward response   LEFT plantar reflexes: downward response   Reflexes: 2+ in bilateral lower extremities     Radiology Imaging:  I reviewed with the patient her X-ray and MRI of the lumbar spine from 2/7/2025.      Assessment  1. L4-5 grade 1 unstable spondylolisthesis    2. L5-S1 bilateral mild-mod foraminal & mild central, L4-5 mild diffuse bulging discs    3. L5-S1 left mild foraminal stenosis    4. left > right L5 radiculopathy         Plan  She will get into the PT in [place] with Tawanna for stabilization.    The patient does not need any injections at this time.    She will let me know how she is doing after she has had 6 sessions of the PT.      She will try Ultram for the pain.    She will follow up in 3 months or sooner if needed.    The patient understands and agrees  with the stated plan.  Beck Cagle MD  2/27/2025

## 2025-02-28 ENCOUNTER — PATIENT MESSAGE (OUTPATIENT)
Dept: PHYSICAL MEDICINE AND REHAB | Facility: CLINIC | Age: 65
End: 2025-02-28

## 2025-03-04 ENCOUNTER — TELEPHONE (OUTPATIENT)
Dept: PHYSICAL THERAPY | Age: 65
End: 2025-03-04

## 2025-03-10 ENCOUNTER — MED REC SCAN ONLY (OUTPATIENT)
Dept: PHYSICAL MEDICINE AND REHAB | Facility: CLINIC | Age: 65
End: 2025-03-10

## 2025-03-28 ENCOUNTER — PATIENT MESSAGE (OUTPATIENT)
Dept: PHYSICAL MEDICINE AND REHAB | Facility: CLINIC | Age: 65
End: 2025-03-28

## 2025-04-09 NOTE — TELEPHONE ENCOUNTER
Last office visit: 2/27/25 - DX:    L4-5 grade 1 unstable spondylolisthesis     L5-S1 bilateral mild-mod foraminal & mild central, L4-5 mild diffuse bulging discs   L5-S1 left mild foraminal stenosis   left > right L5 radiculopathy     Plan:  Plan  She will get into the PT in [place] with Tawanna for stabilization.    She will let me know how she is doing after she has had 6 sessions of the PT.       Patient update:  Reports: \"So far my back is not getting worse. I think the therapy is helping to strengthen my muscles even though it’s painful after words:    Patient inquiring if she should continue PT.

## 2025-04-19 NOTE — TELEPHONE ENCOUNTER
Since she was starting to feel better, I would have her continue with the PT with no change in the plan for now.  Please see how she is feeling now and it anything has changed.

## 2025-06-19 ENCOUNTER — OFFICE VISIT (OUTPATIENT)
Dept: PHYSICAL MEDICINE AND REHAB | Facility: CLINIC | Age: 65
End: 2025-06-19
Payer: COMMERCIAL

## 2025-06-19 VITALS — HEIGHT: 65 IN | WEIGHT: 170 LBS | BODY MASS INDEX: 28.32 KG/M2

## 2025-06-19 DIAGNOSIS — M51.9 LUMBAR DISC DISEASE: ICD-10-CM

## 2025-06-19 DIAGNOSIS — M48.061 LUMBAR FORAMINAL STENOSIS: ICD-10-CM

## 2025-06-19 DIAGNOSIS — M25.551 RIGHT HIP PAIN: ICD-10-CM

## 2025-06-19 DIAGNOSIS — M54.42 CHRONIC LEFT-SIDED LOW BACK PAIN WITH LEFT-SIDED SCIATICA: ICD-10-CM

## 2025-06-19 DIAGNOSIS — M54.16 LUMBAR RADICULOPATHY: Primary | ICD-10-CM

## 2025-06-19 DIAGNOSIS — M43.16 SPONDYLOLISTHESIS OF LUMBAR REGION: ICD-10-CM

## 2025-06-19 DIAGNOSIS — G89.29 CHRONIC LEFT-SIDED LOW BACK PAIN WITH LEFT-SIDED SCIATICA: ICD-10-CM

## 2025-06-19 PROCEDURE — 99214 OFFICE O/P EST MOD 30 MIN: CPT | Performed by: PHYSICAL MEDICINE & REHABILITATION

## 2025-06-19 PROCEDURE — 3008F BODY MASS INDEX DOCD: CPT | Performed by: PHYSICAL MEDICINE & REHABILITATION

## 2025-06-19 NOTE — PROGRESS NOTES
Low Back Pain H & P    Chief Complaint:   Chief Complaint   Patient presents with    Follow - Up     LOV 2/27/25 pt is here for a follow up on back pain. No n/t. No pain meds or muscle relaxer's. Currently in physical therapy.  Pain 0/10     Nursing note reviewed and verified.  She has been seeing Marybeth at [facility].      History of Present Illness  Mira Tobin is a 64 year old female who presents with right hip and leg pain.    She has experienced significant improvement in her back pain, feeling 'almost a hundred percent' better. This improvement is attributed to red light therapy and physical therapy at [facility], where she has been seeing Marybeth. Her back pain is now 'pretty much nonexistent.'    She describes a new issue with pain in her right hip and leg, located in the groin area and sometimes on the outer side of the hip. The pain is sharp and occasionally radiates from the hip down the inner thigh to just above the ankle. It was particularly severe this past weekend after a day of walking, intensifying later in the evening. She rates the pain as high as 9-10 out of 10 at its worst, but it can be as low as 0 when not aggravated. No associated numbness, tingling, or weakness, although the pain causes her to 'walk funny.'    The pain does not usually travel down the leg and typically remains in the hip area. It does not seem to be consistently triggered by specific activities, as she was able to perform heavy gardening work without issue a few weeks ago. Her past treatments for back pain included epidural injections, which were effective except for the last one. She recalls having a hip x-ray done a year ago, but believes she had another one more recently, possibly at a different location.    Past Medical History   Past Medical History[1]    Past Surgical History   Past Surgical History[2]    Family History   Family History[3]    Social History   Social History      Socioeconomic History    Marital status:      Spouse name: Not on file    Number of children: Not on file    Years of education: Not on file    Highest education level: Not on file   Occupational History    Not on file   Tobacco Use    Smoking status: Former     Current packs/day: 0.00     Types: Cigarettes     Start date: 1973     Quit date: 1998     Years since quittin.4    Smokeless tobacco: Never    Tobacco comments:     quit: over 10yrs ago   Vaping Use    Vaping status: Never Used   Substance and Sexual Activity    Alcohol use: Yes     Alcohol/week: 1.0 - 2.0 standard drink of alcohol     Types: 1 - 2 Glasses of wine per week     Comment: social     Drug use: No    Sexual activity: Not on file   Other Topics Concern    Caffeine Concern No    Exercise Not Asked    Seat Belt Not Asked    Special Diet No    Stress Concern Not Asked    Weight Concern Yes   Social History Narrative     with 3 children      Social Drivers of Health     Food Insecurity: Patient Declined (2024)    Received from [facility]    Hunger Vital Sign     Worried About Running Out of Food in the Last Year: Patient declined     Ran Out of Food in the Last Year: Patient declined   Transportation Needs: Patient Declined (2024)    Received from [facility]    PRAPARE - Transportation     Lack of Transportation (Medical): Patient declined     Lack of Transportation (Non-Medical): Patient declined   Stress: Stress Concern Present (2024)    Received from [facility]    [employer] - Occupational Stress Questionnaire     Feeling of Stress : To some extent   Housing Stability: Patient Declined (2024)    Received from [facility]    Housing Stability Vital Sign     Unable to Pay for Housing in the Last Year: Patient declined     Number of Times Moved in the Last Year: Not on file     Homeless in the  Last Year: Patient declined       PE:  The patient does appear in her stated age in no distress.  The patient is well groomed.    Psychiatric:  The patient is alert and oriented x 3.  The patient has a normal affect and mood.      Respiratory:  No acute respiratory distress. Patient does not have a cough.    HEENT:  Extraocular muscles are intact. There is no kern icterus. Pupils are equal, round, and reactive to light. No redness or discharge bilaterally.    Skin:  There are no rashes or lesions.    Vitals:  There were no vitals filed for this visit.    Gait:    Gait: Normal gait   Sit to Stand: no difficulty      Lumbar Spine:    Scoliosis: No scoliosis present     Lumbar Spine Palpation:    Spinous Processes: Non-tender for all Spinous Processes   Z-joints: Non-tender for all Z-joints   SIJ: Non-tender for bilateral SIJ   Piriformis Muscle: Non-tender bilateral Piriformis muscles   Greater Trochanteric Bursa: Non-tender for bilateral Greater Trochanteric Bursa   She is non-tender to  palpation over the bilateral iliopsoas tendons the TFL muscles.    Vascular lower extremity:   Dorsalis pedis pulse-RIGHT 2+   Dorsalis pedis pulse-LEFT 2+   Tibialis posterior pulse-RIGHT 2+   Tibialis posterior pulse-LEFT 2+     Neurological Lower Extremity:    Light Touch Sensation: Intact in bilateral Lower Extremities   LE Muscle Strength: All LE strength measurements 5/5 except:  Hamstring RIGHT:   4+/5  Hamstring LEFT:   4+/5  Hip Flexion RIGHT:  4+/5   RIGHT plantar reflexes: downward response   LEFT plantar reflexes: downward response   Reflexes: 2+ in bilateral lower extremities     Hip: Hips are stable.    RIGHT hip ROM normal   LEFT hip ROM normal   Right hip flexion Negative pain   LEFT hip flexion Negative pain   RIGHT hip CORRINE test Positive for right groin pain   LEFT hip CORRINE test Negative for pain   RIGHT hip internal rotation Positive for right groin pain   LEFT hip internal rotation Negative for pain      Assessment  1. left > right L5 radiculopathy    2. L5-S1 left mild foraminal stenosis    3. L5-S1 bilateral mild-mod foraminal & mild central, L4-5 mild diffuse bulging discs    4. L4-5 grade 1 unstable spondylolisthesis    5. Right hip pain with normal x-ray    6. Chronic left-sided low back pain with left-sided sciatica         Plan  Assessment & Plan  Right hip and groin pain  Intermittent sharp pain radiating from the right hip and groin to the ankle, exacerbated by walking. Differential includes hip versus back-related causes, with possible L4 nerve involvement suggesting spondylolisthesis.  - Order hip x-ray.  - Consider right-sided L4 spinal nerve injection if x-ray is normal.  - Consider MRI of the hip if injections are ineffective.  - Continue physical therapy exercises.  - Schedule follow-up based on x-ray results.    Spondylolisthesis at L4-L5  Spondylolisthesis at L4-L5 with previous L5 injections providing relief except the last one. Current pain may involve L4 nerve distribution, suggesting spondylolisthesis involvement.  - Consider right-sided L4 spinal nerve injection if hip x-ray is normal and pain persists.  - Consider MRI of the hip if injections are ineffective.    Return in about 3 months (around 9/19/2025) for reviewing imaging.     Patient should call prior to next visit if new or worsening symptoms.     The patient understands and agrees with the stated plan.  Beck Cagle MD  6/19/2025           [1]   Past Medical History:   Kidney stone    OTHER DISEASES    recent pink eye .  Cleared up at this time   [2]   Past Surgical History:  Procedure Laterality Date    Cystourethroscopy  3/12/2014    Procedure: LITHOTRIPSY WITH CYSTOSCOPY, STENT PLACEMENT;  Surgeon: Glenn Kinsey MD;  Location: [facility]    Fragmenting of kidney stone  3/12/2014    Procedure: LITHOTRIPSY WITH CYSTOSCOPY, STENT PLACEMENT;  Surgeon: Glenn Kinsey MD;  Location: [facility]     Hemorrhoidectomy            Other surgical history      tubal ligation    Other surgical history      ovarion cyst    Other surgical history  3/25/14    cysto stent removal - Dr. Freeman    Tubal ligation     [3]   Family History  Problem Relation Age of Onset    Stroke Mother     Other (dementia) Mother     Diabetes Father     Other (kidney stones) Sister     Diabetes Brother         Recent heart attack and stroke (2023)

## 2025-06-19 NOTE — PROGRESS NOTES
The following individual(s) verbally consented to be recorded using ambient AI listening technology and understand that they can each withdraw their consent to this listening technology at any point by asking the clinician to turn off or pause the recording:    Patient name: Mira Tobin  Additional names:  MIRANDA WADE

## 2025-06-24 ENCOUNTER — HOSPITAL ENCOUNTER (OUTPATIENT)
Dept: GENERAL RADIOLOGY | Age: 65
Discharge: HOME | End: 2025-06-24
Attending: PHYSICAL MEDICINE & REHABILITATION
Payer: COMMERCIAL

## 2025-06-24 DIAGNOSIS — M25.551 RIGHT HIP PAIN: ICD-10-CM

## 2025-06-24 PROCEDURE — 73502 X-RAY EXAM HIP UNI 2-3 VIEWS: CPT | Performed by: PHYSICAL MEDICINE & REHABILITATION

## 2025-06-30 ENCOUNTER — TELEPHONE (OUTPATIENT)
Dept: PHYSICAL MEDICINE AND REHAB | Facility: CLINIC | Age: 65
End: 2025-06-30

## 2025-06-30 DIAGNOSIS — M54.16 LUMBAR RADICULOPATHY: Primary | ICD-10-CM

## 2025-06-30 NOTE — TELEPHONE ENCOUNTER
Patient has been scheduled for Right L4 Transforaminal Epidural Steroid Injection on 7/11/2025 at the [facility] with Dr. Cagle.   Anesthesia type:  Local  Please note: [facility] will call the business day prior to discuss the exact time/arrival and additional instructions for your appointment.  Patient was advised that if he/she does receive the covid vaccine it needs to be at least 2 weeks before or after the injection.  Medications and allergies reviewed.  Educated to hold NSAIDS (Aleve, Ibuprofen, Motrin, Advil) and anti-inflammatories (Meloxicam, Naproxen, Diclofenac, Celebrex) and for cervical injections must hold Multi-Vitamins, Vitamin E, Fish Oil/Omega-3.  Patient informed of [facility]'s  policy:  The patient will require transportation arrangements to and from the procedure, with the  present on site for the entire visit.  Without a , the appointment is subject to cancellation.    [facility] is located in the [facility] [facility] [address] [place] [address].   may park in the yellow/purple parking lot.  Patient verbalized understanding and agrees with plan.  Scheduled in Epic: Yes  Scheduled in Surgical Case: Yes  Follow up appointment made: NOV: 9/25/2025 Beck Cagle MD  Authorization date valid until 7/14/2025 at [facility].

## 2025-07-18 ENCOUNTER — PATIENT MESSAGE (OUTPATIENT)
Dept: PHYSICAL MEDICINE AND REHAB | Facility: CLINIC | Age: 65
End: 2025-07-18

## (undated) NOTE — LETTER
6/26/2025      Beck Cagle MD  Physical Medicine and Rehabilitation  [address] [place] [address], [address]  [facility] [address]  Dept: [phone]  Dept Fax: [phone]        RE: Consultation for Mira Tobin        Dear Iman Kc MD,    Thank you very much for the opportunity to see your patient.  Attached please find a summary from your patient's recent visit.     I appreciate the chance to take care of your patient with you.  Please feel free to call me with any questions or concerns.    Sincerely,        Beck Cagle MD  Electronically Signed on 6/26/2025

## (undated) NOTE — LETTER
5/19/2023      Judy Fallon MD  Physical Medicine and Rehabilitation  [address] [facility], [address] [place] [address]  Dept: [phone]  Dept Fax: [phone]        RE: Consultation for Abdulaziz Rodgers        Dear Gordon Franco MD,    Thank you very much for the opportunity to see your patient. Attached please find a summary from your patient's recent visit. I appreciate the chance to take care of your patient with you. Please feel free to call me with any questions or concerns. Sincerely,        Lori Abdi.  Quinn Fallon MD  Electronically Signed on 5/19/2023

## (undated) NOTE — LETTER
1/30/2025      Beck Cagle MD  Physical Medicine and Rehabilitation  [address] [place] [address], [address]  [facility] [address]  Dept: [phone]  Dept Fax: [phone]        RE: Consultation for Mira Tobin        Dear Iman Kc MD,    Thank you very much for the opportunity to see your patient.  Attached please find a summary from your patient's recent visit.     I appreciate the chance to take care of your patient with you.  Please feel free to call me with any questions or concerns.    Sincerely,        Beck Cagle MD  Electronically Signed on 1/30/2025

## (undated) NOTE — LETTER
10/10/2024      Beck Cagle MD  Physical Medicine and Rehabilitation  [address] [place] [address], [address]  [facility] [address]  Dept: [phone]  Dept Fax: [phone]        RE: Consultation for Mira Tobin        Dear Iman Kc MD,    Thank you very much for the opportunity to see your patient.  Attached please find a summary from your patient's recent visit.     I appreciate the chance to take care of your patient with you.  Please feel free to call me with any questions or concerns.    Sincerely,        Beck Cagle MD  Electronically Signed on 10/10/2024

## (undated) NOTE — LETTER
5/31/2023      Boni Avalos MD  Physical Medicine and Rehabilitation  [address] [facility], [address] [place] [address]  Dept: [phone]  Dept Fax: [phone]        RE: Consultation for Hayley Bustillo        Dear Celso Cochran MD,    Thank you very much for the opportunity to see your patient. Attached please find a summary from your patient's recent visit. I appreciate the chance to take care of your patient with you. Please feel free to call me with any questions or concerns. Sincerely,        Geno Cota.  Adry Avalos MD  Electronically Signed on 5/31/2023

## (undated) NOTE — LETTER
1/20/2023      Manasa Pacheco MD  Physical Medicine and Rehabilitation  [address] [facility], [address] [place] [address]  Dept: [phone]  Dept Fax: [phone]        RE: Consultation for Ammy Mirza        Dear Larry Rosales MD,    Thank you very much for the opportunity to see your patient. Attached please find a summary from your patient's recent visit. I appreciate the chance to take care of your patient with you. Please feel free to call me with any questions or concerns. Sincerely,        Pepito Cabrera.  Fatemeh Pacheco MD  Electronically Signed on 1/20/2023

## (undated) NOTE — LETTER
1/11/2023      Malinda Claude A. Bunnie Pac, MD  Physical Medicine and Rehabilitation  [address] [facility], [address] [place] [address]  Dept: [phone]  Dept Fax: [phone]        RE: Consultation for Debbie Espana        Dear Mahesh Oconnor MD,    Thank you very much for the opportunity to see your patient. Attached please find a summary from your patient's recent visit. I appreciate the chance to take care of your patient with you. Please feel free to call me with any questions or concerns. Sincerely,        Mattie Vasquez MD  Electronically Signed on 1/11/2023

## (undated) NOTE — LETTER
8/10/2023      Uday Buchanan MD  Physical Medicine and Rehabilitation  [address] [facility], [address] [place] [address]  Dept: [phone]  Dept Fax: [phone]        RE: Consultation for Mick Staley        Dear Norris Das MD,    Thank you very much for the opportunity to see your patient. Attached please find a summary from your patient's recent visit. I appreciate the chance to take care of your patient with you. Please feel free to call me with any questions or concerns. Sincerely,        Jesus Nguyễn.  Sahara Buchanan MD  Electronically Signed on 8/10/2023

## (undated) NOTE — LETTER
2/27/2025      Beck Cagle MD  Physical Medicine and Rehabilitation  [address] [place] [address], [address]  [facility] [address]  Dept: [phone]  Dept Fax: [phone]        RE: Consultation for Mira Tobin        Dear Iman Kc MD,    Thank you very much for the opportunity to see your patient.  Attached please find a summary from your patient's recent visit.     I appreciate the chance to take care of your patient with you.  Please feel free to call me with any questions or concerns.    Sincerely,        Beck Cagle MD  Electronically Signed on 2/27/2025

## (undated) NOTE — LETTER
6/19/2024      Beck Cagle MD  Physical Medicine and Rehabilitation  [address] [place] [address], [address]  [facility] [address]  Dept: [phone]  Dept Fax: [phone]        RE: Consultation for Mira Tobin        Dear Iman Kc MD,    Thank you very much for the opportunity to see your patient.  Attached please find a summary from your patient's recent visit.     I appreciate the chance to take care of your patient with you.  Please feel free to call me with any questions or concerns.    Sincerely,        Beck Cagle MD  Electronically Signed on 6/19/2024